# Patient Record
Sex: MALE | Race: WHITE | Employment: OTHER | URBAN - METROPOLITAN AREA
[De-identification: names, ages, dates, MRNs, and addresses within clinical notes are randomized per-mention and may not be internally consistent; named-entity substitution may affect disease eponyms.]

---

## 2017-05-06 ENCOUNTER — COMMUNICATION - HEALTHEAST (OUTPATIENT)
Dept: INTERNAL MEDICINE | Facility: CLINIC | Age: 82
End: 2017-05-06

## 2017-05-06 DIAGNOSIS — I10 HTN (HYPERTENSION): ICD-10-CM

## 2017-07-24 ENCOUNTER — RECORDS - HEALTHEAST (OUTPATIENT)
Dept: ADMINISTRATIVE | Facility: OTHER | Age: 82
End: 2017-07-24

## 2017-08-07 ENCOUNTER — RECORDS - HEALTHEAST (OUTPATIENT)
Dept: ADMINISTRATIVE | Facility: OTHER | Age: 82
End: 2017-08-07

## 2017-08-28 ENCOUNTER — AMBULATORY - HEALTHEAST (OUTPATIENT)
Dept: NURSING | Facility: CLINIC | Age: 82
End: 2017-08-28

## 2017-08-28 DIAGNOSIS — Z23 NEED FOR VACCINATION: ICD-10-CM

## 2017-09-05 ENCOUNTER — RECORDS - HEALTHEAST (OUTPATIENT)
Dept: ADMINISTRATIVE | Facility: OTHER | Age: 82
End: 2017-09-05

## 2018-02-20 ENCOUNTER — COMMUNICATION - HEALTHEAST (OUTPATIENT)
Dept: INTERNAL MEDICINE | Facility: CLINIC | Age: 83
End: 2018-02-20

## 2018-02-20 DIAGNOSIS — I10 HTN (HYPERTENSION): ICD-10-CM

## 2018-08-03 ENCOUNTER — RECORDS - HEALTHEAST (OUTPATIENT)
Dept: ADMINISTRATIVE | Facility: OTHER | Age: 83
End: 2018-08-03

## 2018-08-08 ENCOUNTER — RECORDS - HEALTHEAST (OUTPATIENT)
Dept: ADMINISTRATIVE | Facility: OTHER | Age: 83
End: 2018-08-08

## 2018-09-07 ENCOUNTER — RECORDS - HEALTHEAST (OUTPATIENT)
Dept: ADMINISTRATIVE | Facility: OTHER | Age: 83
End: 2018-09-07

## 2018-09-17 ENCOUNTER — COMMUNICATION - HEALTHEAST (OUTPATIENT)
Dept: INTERNAL MEDICINE | Facility: CLINIC | Age: 83
End: 2018-09-17

## 2018-09-21 ENCOUNTER — COMMUNICATION - HEALTHEAST (OUTPATIENT)
Dept: INTERNAL MEDICINE | Facility: CLINIC | Age: 83
End: 2018-09-21

## 2018-09-21 DIAGNOSIS — I10 HTN (HYPERTENSION): ICD-10-CM

## 2018-10-08 ENCOUNTER — AMBULATORY - HEALTHEAST (OUTPATIENT)
Dept: NURSING | Facility: CLINIC | Age: 83
End: 2018-10-08

## 2018-10-08 DIAGNOSIS — Z23 FLU VACCINE NEED: ICD-10-CM

## 2018-10-23 ENCOUNTER — RECORDS - HEALTHEAST (OUTPATIENT)
Dept: ADMINISTRATIVE | Facility: OTHER | Age: 83
End: 2018-10-23

## 2018-12-07 ENCOUNTER — COMMUNICATION - HEALTHEAST (OUTPATIENT)
Dept: INTERNAL MEDICINE | Facility: CLINIC | Age: 83
End: 2018-12-07

## 2018-12-14 ENCOUNTER — OFFICE VISIT - HEALTHEAST (OUTPATIENT)
Dept: INTERNAL MEDICINE | Facility: CLINIC | Age: 83
End: 2018-12-14

## 2018-12-14 DIAGNOSIS — Z01.818 PREOPERATIVE EXAMINATION: ICD-10-CM

## 2018-12-14 LAB — POTASSIUM BLD-SCNC: 4 MMOL/L (ref 3.5–5)

## 2018-12-14 ASSESSMENT — MIFFLIN-ST. JEOR: SCORE: 1379.79

## 2019-03-11 ENCOUNTER — RECORDS - HEALTHEAST (OUTPATIENT)
Dept: ADMINISTRATIVE | Facility: OTHER | Age: 84
End: 2019-03-11

## 2019-04-29 ENCOUNTER — COMMUNICATION - HEALTHEAST (OUTPATIENT)
Dept: INTERNAL MEDICINE | Facility: CLINIC | Age: 84
End: 2019-04-29

## 2019-04-29 DIAGNOSIS — I10 HTN (HYPERTENSION): ICD-10-CM

## 2019-04-30 ENCOUNTER — RECORDS - HEALTHEAST (OUTPATIENT)
Dept: ADMINISTRATIVE | Facility: OTHER | Age: 84
End: 2019-04-30

## 2019-06-11 ENCOUNTER — RECORDS - HEALTHEAST (OUTPATIENT)
Dept: ADMINISTRATIVE | Facility: OTHER | Age: 84
End: 2019-06-11

## 2019-07-09 ENCOUNTER — RECORDS - HEALTHEAST (OUTPATIENT)
Dept: ADMINISTRATIVE | Facility: OTHER | Age: 84
End: 2019-07-09

## 2019-07-16 ENCOUNTER — COMMUNICATION - HEALTHEAST (OUTPATIENT)
Dept: SCHEDULING | Facility: CLINIC | Age: 84
End: 2019-07-16

## 2019-07-16 ENCOUNTER — TRANSFERRED RECORDS (OUTPATIENT)
Dept: HEALTH INFORMATION MANAGEMENT | Facility: CLINIC | Age: 84
End: 2019-07-16

## 2019-08-05 ENCOUNTER — RECORDS - HEALTHEAST (OUTPATIENT)
Dept: ADMINISTRATIVE | Facility: OTHER | Age: 84
End: 2019-08-05

## 2019-08-06 ENCOUNTER — RECORDS - HEALTHEAST (OUTPATIENT)
Dept: ADMINISTRATIVE | Facility: OTHER | Age: 84
End: 2019-08-06

## 2019-09-24 ENCOUNTER — RECORDS - HEALTHEAST (OUTPATIENT)
Dept: ADMINISTRATIVE | Facility: OTHER | Age: 84
End: 2019-09-24

## 2019-10-15 ENCOUNTER — RECORDS - HEALTHEAST (OUTPATIENT)
Dept: ADMINISTRATIVE | Facility: OTHER | Age: 84
End: 2019-10-15

## 2020-06-17 ENCOUNTER — COMMUNICATION - HEALTHEAST (OUTPATIENT)
Dept: INTERNAL MEDICINE | Facility: CLINIC | Age: 85
End: 2020-06-17

## 2020-06-17 DIAGNOSIS — I10 HTN (HYPERTENSION): ICD-10-CM

## 2020-07-23 ENCOUNTER — COMMUNICATION - HEALTHEAST (OUTPATIENT)
Dept: SCHEDULING | Facility: CLINIC | Age: 85
End: 2020-07-23

## 2020-07-23 ENCOUNTER — OFFICE VISIT - HEALTHEAST (OUTPATIENT)
Dept: INTERNAL MEDICINE | Facility: CLINIC | Age: 85
End: 2020-07-23

## 2020-07-23 ENCOUNTER — TRANSFERRED RECORDS (OUTPATIENT)
Dept: HEALTH INFORMATION MANAGEMENT | Facility: CLINIC | Age: 85
End: 2020-07-23

## 2020-07-23 DIAGNOSIS — M79.10 MYALGIA: ICD-10-CM

## 2020-07-23 DIAGNOSIS — R50.9 FEVER, UNSPECIFIED FEVER CAUSE: ICD-10-CM

## 2020-07-23 ASSESSMENT — PATIENT HEALTH QUESTIONNAIRE - PHQ9: SUM OF ALL RESPONSES TO PHQ QUESTIONS 1-9: 0

## 2020-09-22 ENCOUNTER — TRANSFERRED RECORDS (OUTPATIENT)
Dept: HEALTH INFORMATION MANAGEMENT | Facility: CLINIC | Age: 85
End: 2020-09-22

## 2020-09-30 ENCOUNTER — TRANSCRIBE ORDERS (OUTPATIENT)
Dept: OTHER | Age: 85
End: 2020-09-30

## 2020-09-30 DIAGNOSIS — R91.8 PULMONARY NODULES: Primary | ICD-10-CM

## 2020-10-09 NOTE — TELEPHONE ENCOUNTER
ONCOLOGY INTAKE: Records Information      APPT INFORMATION:  Referring provider:  [facility],  Referring provider s clinic:  N/a  Reason for visit/diagnosis: Pulmonary Nodules  Has patient been notified of appointment date and time?: Yes    RECORDS INFORMATION:  Were the records received with the referral (via [employer])? Yes    Has patient been seen for any external appt for this diagnosis? Yes    If yes, where? [facility],    Has patient had any imaging or procedures outside of [place] for this condition? Yes      If Yes, where? [facility],    ADDITIONAL INFORMATION:  None

## 2020-10-12 NOTE — TELEPHONE ENCOUNTER
RECORDS STATUS - ALL OTHER DIAGNOSIS      RECORDS RECEIVED FROM: [facility]   Pulmonary nodules   DATE RECEIVED:    NOTES STATUS DETAILS   OFFICE NOTE from referring provider     OFFICE NOTE from medical oncologist Epic 10/9/2020 Pulmonary Nodule Conf note    DISCHARGE SUMMARY from hospital     DISCHARGE REPORT from the ER Care Everywhere  9/22/2020 ED note from Jarred Hubbard MD ([facility]  )   OPERATIVE REPORT     MEDICATION LIST Care Everywhere     CLINICAL TRIAL TREATMENTS TO DATE     LABS     PATHOLOGY REPORTS     ANYTHING RELATED TO DIAGNOSIS     GENONOMIC TESTING     TYPE:     IMAGING (NEED IMAGES & REPORT)     CT SCANS Care Everywhere - PACS [facility] Images  9/22/2020 CT Chest   9/22/2020 XR Chest   MRI     MAMMO     ULTRASOUND     PET

## 2020-10-14 ENCOUNTER — RECORDS - HEALTHEAST (OUTPATIENT)
Dept: ADMINISTRATIVE | Facility: OTHER | Age: 85
End: 2020-10-14

## 2020-10-14 ENCOUNTER — VIRTUAL VISIT (OUTPATIENT)
Dept: PULMONOLOGY | Facility: CLINIC | Age: 85
End: 2020-10-14
Attending: INTERNAL MEDICINE
Payer: MEDICARE

## 2020-10-14 ENCOUNTER — PRE VISIT (OUTPATIENT)
Dept: PULMONOLOGY | Facility: CLINIC | Age: 85
End: 2020-10-14

## 2020-10-14 DIAGNOSIS — R91.8 PULMONARY NODULES: Primary | ICD-10-CM

## 2020-10-14 PROCEDURE — 99204 OFFICE O/P NEW MOD 45 MIN: CPT | Mod: 95 | Performed by: INTERNAL MEDICINE

## 2020-10-14 RX ORDER — OXYCODONE HYDROCHLORIDE 5 MG/1
5-10 TABLET ORAL
COMMUNITY
Start: 2020-09-22 | End: 2020-11-13
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
## 2020-10-15 ENCOUNTER — RECORDS - HEALTHEAST (OUTPATIENT)
Dept: ADMINISTRATIVE | Facility: OTHER | Age: 85
End: 2020-10-15

## 2020-11-11 ENCOUNTER — PREP FOR PROCEDURE (OUTPATIENT)
Dept: SURGERY | Facility: CLINIC | Age: 85
End: 2020-11-11

## 2020-11-11 ENCOUNTER — TELEPHONE (OUTPATIENT)
Dept: PULMONOLOGY | Facility: CLINIC | Age: 85
End: 2020-11-11

## 2020-11-11 DIAGNOSIS — R59.0 MEDIASTINAL ADENOPATHY: Primary | ICD-10-CM

## 2020-11-11 RX ORDER — LIDOCAINE 40 MG/G
CREAM TOPICAL
Status: CANCELLED | OUTPATIENT
Start: 2020-11-11

## 2020-11-11 NOTE — TELEPHONE ENCOUNTER
Call patient to schedule procedure with Dr. Bigg Montes   There was no answer, detail message was left.     Soraya Contreras  [place]iana-Op Coordinator  [phone]

## 2020-11-12 DIAGNOSIS — Z11.59 ENCOUNTER FOR SCREENING FOR OTHER VIRAL DISEASES: Primary | ICD-10-CM

## 2020-11-12 PROBLEM — R91.8 PULMONARY NODULES: Status: ACTIVE | Noted: 2020-11-12

## 2020-11-12 NOTE — TELEPHONE ENCOUNTER
Spoke with patient to schedule procedure with Dr. Bigg Montes    Procedure was scheduled on 11/19 at [facility] OR  Patient will have H&P with PAC 11/13    Patient is aware a COVID-19 test is needed before their procedure. The test should be with-in 4 days of their procedure.   Test Details: Date 11/18  Location [facility]     Patient is aware a / is needed day of surgery.   Surgery letter was sent via [employer], patient has my direct contact information for any further questions.

## 2020-11-12 NOTE — TELEPHONE ENCOUNTER
FUTURE VISIT INFORMATION      SURGERY INFORMATION:    Date: 11/19/20    Location: [facility] or    Surgeon:  Richie Montes MD    Anesthesia Type:  general    Procedure: BRONCHOSCOPY, WITH BIOPSY OF 1 OR 2 LYMPH NODE STATIONS WITH ENDOBRONCHIAL ULTRASOUND GUIDANCE      RECORDS REQUESTED FROM:       [facility] Care [facility]    Pertinent Medical History: pulmonary nodules

## 2020-11-13 ENCOUNTER — RECORDS - HEALTHEAST (OUTPATIENT)
Dept: ADMINISTRATIVE | Facility: OTHER | Age: 85
End: 2020-11-13

## 2020-11-13 ENCOUNTER — VIRTUAL VISIT (OUTPATIENT)
Dept: SURGERY | Facility: CLINIC | Age: 85
End: 2020-11-13
Payer: MEDICARE

## 2020-11-13 ENCOUNTER — ANESTHESIA EVENT (OUTPATIENT)
Dept: SURGERY | Facility: CLINIC | Age: 85
End: 2020-11-13
Payer: MEDICARE

## 2020-11-13 ENCOUNTER — PRE VISIT (OUTPATIENT)
Dept: SURGERY | Facility: CLINIC | Age: 85
End: 2020-11-13

## 2020-11-13 VITALS — BODY MASS INDEX: 23.49 KG/M2 | WEIGHT: 155 LBS | HEIGHT: 68 IN

## 2020-11-13 DIAGNOSIS — Z01.818 PRE-OP EXAMINATION: Primary | ICD-10-CM

## 2020-11-13 PROCEDURE — 99203 OFFICE O/P NEW LOW 30 MIN: CPT | Mod: 95 | Performed by: PHYSICIAN ASSISTANT

## 2020-11-13 SDOH — HEALTH STABILITY: MENTAL HEALTH: HOW OFTEN DO YOU HAVE 6 OR MORE DRINKS ON ONE OCCASION?: NOT ASKED

## 2020-11-13 SDOH — HEALTH STABILITY: MENTAL HEALTH: HOW OFTEN DO YOU HAVE A DRINK CONTAINING ALCOHOL?: 4 OR MORE TIMES A WEEK

## 2020-11-13 SDOH — HEALTH STABILITY: MENTAL HEALTH: HOW MANY STANDARD DRINKS CONTAINING ALCOHOL DO YOU HAVE ON A TYPICAL DAY?: NOT ASKED

## 2020-11-13 ASSESSMENT — ENCOUNTER SYMPTOMS: SEIZURES: 0

## 2020-11-13 ASSESSMENT — PAIN SCALES - GENERAL: PAINLEVEL: NO PAIN (0)

## 2020-11-13 ASSESSMENT — LIFESTYLE VARIABLES: TOBACCO_USE: 1

## 2020-11-13 ASSESSMENT — MIFFLIN-ST. JEOR: SCORE: 1352.58

## 2020-11-13 NOTE — PROGRESS NOTES
"Lam Barrera Jr. is a 87 year old male who is being evaluated via a billable video visit.      The patient has been notified of following:     \"This video visit will be conducted via a call between you and your physician/provider. We have found that certain health care needs can be provided without the need for an in-person physical exam.  This service lets us provide the care you need with a video conversation.  If a prescription is necessary we can send it directly to your pharmacy.  If lab work is needed we can place an order for that and you can then stop by our lab to have the test done at a later time.    Video visits are billed at different rates depending on your insurance coverage.  Please reach out to your insurance provider with any questions.    If during the course of the call the physician/provider feels a video visit is not appropriate, you will not be charged for this service.\"    Patient has given verbal consent for Video visit? Yes  How would you like to obtain your AVS? Kamleshhart  If you are dropped from the video visit, the video invite should be resent to: Other e-mail: [email or web address]  Will anyone else be joining your video visit? No      Neil Etienne        "
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
## 2020-11-13 NOTE — PATIENT INSTRUCTIONS
Preparing for Your Surgery      Name:  Lam Barrera Jr.   MRN:  [id number]   :  1933   Today's Date:  2020       Arriving for surgery:  Surgery date:  2020  Arrival time:  8:45 am    Restrictions due to COVID 19:  Patients are allowed one visitor in the pre-op period  All visitors must wear a mask  No visitors under 18  No ill visitors   parking is available for anyone with mobility limitations or disabilities.  ([facility]  24 hours/ 7 days a week; [facility]  7 am- 3:30 pm, Mon- Fri)    Please come to:       [facility] [address]  [address] [place]  [address]       -    Please proceed to the Surgery Lounge on the 3rd floor. [phone]?     - ?If you are in need of directions, wheelchair or escort please stop at the Information Desk in the lobby.      What can I eat or drink?  -  You may eat and drink normally for up to 8 hours before your surgery. (Until Midnight)  -  You may have clear liquids until 2 hours before surgery. (Until 8:45 am arrival time )  Examples of clear liquids:  Water  Clear broth  Juices (apple, white grape, white cranberry  and cider) without pulp  Noncarbonated, powder based beverages  (lemonade and Aron-Aid)  Sodas (Sprite, 7-Up, ginger ale and seltzer)  Coffee or tea (without milk or cream)  Gatorade    -  No Alcohol for at least 24 hours before surgery     Which medicines can I take?    Hold Aspirin for 7 days before surgery.   Hold Multivitamins for 7 days before surgery.  Hold Supplements for 7 days before surgery.  Hold Ibuprofen (Advil, Motrin) for 1 day before surgery--unless otherwise directed by surgeon.  Hold Naproxen (Aleve) for 4 days before surgery.    -  DO NOT take these medications the day of surgery:  Hydrochlorothiazide      -  PLEASE TAKE these medications the day of surgery:  Terazosin      How do I prepare myself?  - Please take 2 showers before surgery using Scrubcare or Hibiclens soap.    Use this  soap only from the neck to your toes.     Leave the soap on your skin for one minute--then rinse thoroughly.      You may use your own shampoo and conditioner; no other hair products.   - Please remove all jewelry and body piercings.  - No lotions, deodorants or fragrance.  - No makeup or fingernail polish.   - Bring your ID and insurance card.    - All patients are required to have a Covid-19 test within 4 days of surgery/procedure.      -Patients will be contacted by the [facility] scheduling team within 1 week of surgery to make an appointment.      - Patients may call the Scheduling team at [phone] if they have not been scheduled within 4 days of  surgery.      ALL PATIENTS GOING HOME THE SAME DAY OF SURGERY ARE REQUIRED TO HAVE A RESPONSIBLE ADULT TO DRIVE AND BE IN ATTENDANCE WITH THEM FOR 24 HOURS FOLLOWING SURGERY     Questions or Concerns:    - For any questions regarding the day of surgery or your hospital stay, please contact the Pre Admission Nursing Office at [phone].       - If you have health changes between today and your surgery please call your surgeon.       For questions after surgery please call your surgeons office.

## 2020-11-17 DIAGNOSIS — Z11.59 ENCOUNTER FOR SCREENING FOR OTHER VIRAL DISEASES: ICD-10-CM

## 2020-11-17 PROCEDURE — U0003 INFECTIOUS AGENT DETECTION BY NUCLEIC ACID (DNA OR RNA); SEVERE ACUTE RESPIRATORY SYNDROME CORONAVIRUS 2 (SARS-COV-2) (CORONAVIRUS DISEASE [COVID-19]), AMPLIFIED PROBE TECHNIQUE, MAKING USE OF HIGH THROUGHPUT TECHNOLOGIES AS DESCRIBED BY CMS-2020-01-R: HCPCS | Performed by: PATHOLOGY

## 2020-11-18 LAB
SARS-COV-2 RNA SPEC QL NAA+PROBE: NOT DETECTED
SPECIMEN SOURCE: NORMAL

## 2020-11-19 ENCOUNTER — APPOINTMENT (OUTPATIENT)
Dept: GENERAL RADIOLOGY | Facility: CLINIC | Age: 85
End: 2020-11-19
Attending: STUDENT IN AN ORGANIZED HEALTH CARE EDUCATION/TRAINING PROGRAM
Payer: MEDICARE

## 2020-11-19 ENCOUNTER — ANESTHESIA (OUTPATIENT)
Dept: SURGERY | Facility: CLINIC | Age: 85
End: 2020-11-19
Payer: MEDICARE

## 2020-11-19 ENCOUNTER — HOSPITAL ENCOUNTER (OUTPATIENT)
Facility: CLINIC | Age: 85
Discharge: HOME | End: 2020-11-19
Attending: INTERNAL MEDICINE | Admitting: INTERNAL MEDICINE
Payer: MEDICARE

## 2020-11-19 ENCOUNTER — TELEPHONE (OUTPATIENT)
Dept: PULMONOLOGY | Facility: CLINIC | Age: 85
End: 2020-11-19

## 2020-11-19 VITALS
OXYGEN SATURATION: 93 % | DIASTOLIC BLOOD PRESSURE: 63 MMHG | WEIGHT: 160.5 LBS | HEART RATE: 67 BPM | SYSTOLIC BLOOD PRESSURE: 126 MMHG | TEMPERATURE: 98.2 F | BODY MASS INDEX: 24.32 KG/M2 | HEIGHT: 68 IN | RESPIRATION RATE: 16 BRPM

## 2020-11-19 DIAGNOSIS — R91.8 PULMONARY NODULES: ICD-10-CM

## 2020-11-19 LAB
CREAT SERPL-MCNC: 1.2 MG/DL (ref 0.66–1.25)
GFR SERPL CREATININE-BSD FRML MDRD: 54 ML/MIN/{1.73_M2}
GLUCOSE BLDC GLUCOMTR-MCNC: 96 MG/DL (ref 70–99)
HGB BLD-MCNC: 15.3 G/DL (ref 13.3–17.7)
POTASSIUM SERPL-SCNC: 3.8 MMOL/L (ref 3.4–5.3)

## 2020-11-19 PROCEDURE — 88305 TISSUE EXAM BY PATHOLOGIST: CPT | Mod: TC | Performed by: INTERNAL MEDICINE

## 2020-11-19 PROCEDURE — 272N000001 HC OR GENERAL SUPPLY STERILE: Performed by: INTERNAL MEDICINE

## 2020-11-19 PROCEDURE — 250N000009 HC RX 250: Performed by: NURSE ANESTHETIST, CERTIFIED REGISTERED

## 2020-11-19 PROCEDURE — 84132 ASSAY OF SERUM POTASSIUM: CPT | Performed by: ANESTHESIOLOGY

## 2020-11-19 PROCEDURE — 250N000011 HC RX IP 250 OP 636: Performed by: NURSE ANESTHETIST, CERTIFIED REGISTERED

## 2020-11-19 PROCEDURE — 88172 CYTP DX EVAL FNA 1ST EA SITE: CPT | Mod: 26 | Performed by: PATHOLOGY

## 2020-11-19 PROCEDURE — 360N000029 HC SURGERY LEVEL 4 EA 15 ADDTL MIN - UMMC: Performed by: INTERNAL MEDICINE

## 2020-11-19 PROCEDURE — 71045 X-RAY EXAM CHEST 1 VIEW: CPT | Mod: 26 | Performed by: RADIOLOGY

## 2020-11-19 PROCEDURE — 999N000065 XR CHEST PORT 1 VW

## 2020-11-19 PROCEDURE — 999N000139 HC STATISTIC PRE-PROCEDURE ASSESSMENT II: Performed by: INTERNAL MEDICINE

## 2020-11-19 PROCEDURE — 360N000031 HC SURGERY LEVEL 4 W FLUORO 1ST 30 MIN - UMMC: Performed by: INTERNAL MEDICINE

## 2020-11-19 PROCEDURE — 761N000003 HC RECOVERY PHASE 1 LEVEL 2 FIRST HR: Performed by: INTERNAL MEDICINE

## 2020-11-19 PROCEDURE — 999N000054 HC STATISTIC EKG NON-CHARGEABLE

## 2020-11-19 PROCEDURE — 370N000002 HC ANESTHESIA TECHNICAL FEE, EACH ADDTL 15 MIN: Performed by: INTERNAL MEDICINE

## 2020-11-19 PROCEDURE — 93005 ELECTROCARDIOGRAM TRACING: CPT

## 2020-11-19 PROCEDURE — 999N001018 HC STATISTIC H-CELL BLOCK W/STAIN: Performed by: INTERNAL MEDICINE

## 2020-11-19 PROCEDURE — 88305 TISSUE EXAM BY PATHOLOGIST: CPT | Mod: 26 | Performed by: PATHOLOGY

## 2020-11-19 PROCEDURE — 85018 HEMOGLOBIN: CPT | Performed by: ANESTHESIOLOGY

## 2020-11-19 PROCEDURE — 761N000007 HC RECOVERY PHASE 2 EACH 15 MINS: Performed by: INTERNAL MEDICINE

## 2020-11-19 PROCEDURE — 36415 COLL VENOUS BLD VENIPUNCTURE: CPT | Performed by: ANESTHESIOLOGY

## 2020-11-19 PROCEDURE — 370N000001 HC ANESTHESIA TECHNICAL FEE, 1ST 30 MIN: Performed by: INTERNAL MEDICINE

## 2020-11-19 PROCEDURE — 88173 CYTOPATH EVAL FNA REPORT: CPT | Mod: TC | Performed by: INTERNAL MEDICINE

## 2020-11-19 PROCEDURE — 31652 BRONCH EBUS SAMPLNG 1/2 NODE: CPT | Mod: GC | Performed by: INTERNAL MEDICINE

## 2020-11-19 PROCEDURE — 82565 ASSAY OF CREATININE: CPT | Performed by: ANESTHESIOLOGY

## 2020-11-19 PROCEDURE — 88173 CYTOPATH EVAL FNA REPORT: CPT | Mod: 26 | Performed by: PATHOLOGY

## 2020-11-19 PROCEDURE — 258N000003 HC RX IP 258 OP 636: Performed by: NURSE ANESTHETIST, CERTIFIED REGISTERED

## 2020-11-19 PROCEDURE — 93010 ELECTROCARDIOGRAM REPORT: CPT | Mod: 59 | Performed by: INTERNAL MEDICINE

## 2020-11-19 PROCEDURE — 88172 CYTP DX EVAL FNA 1ST EA SITE: CPT | Mod: TC | Performed by: INTERNAL MEDICINE

## 2020-11-19 PROCEDURE — 250N000009 HC RX 250: Performed by: STUDENT IN AN ORGANIZED HEALTH CARE EDUCATION/TRAINING PROGRAM

## 2020-11-19 PROCEDURE — 999N001017 HC STATISTIC GLUCOSE BY METER IP

## 2020-11-19 PROCEDURE — 258N000003 HC RX IP 258 OP 636: Performed by: ANESTHESIOLOGY

## 2020-11-19 RX ORDER — PROPOFOL 10 MG/ML
INJECTION, EMULSION INTRAVENOUS CONTINUOUS PRN
Status: DISCONTINUED | OUTPATIENT
Start: 2020-11-19 | End: 2020-11-19

## 2020-11-19 RX ORDER — EPHEDRINE SULFATE 50 MG/ML
INJECTION, SOLUTION INTRAMUSCULAR; INTRAVENOUS; SUBCUTANEOUS PRN
Status: DISCONTINUED | OUTPATIENT
Start: 2020-11-19 | End: 2020-11-19

## 2020-11-19 RX ORDER — PROPOFOL 10 MG/ML
INJECTION, EMULSION INTRAVENOUS PRN
Status: DISCONTINUED | OUTPATIENT
Start: 2020-11-19 | End: 2020-11-19

## 2020-11-19 RX ORDER — LIDOCAINE 40 MG/G
CREAM TOPICAL
Status: DISCONTINUED | OUTPATIENT
Start: 2020-11-19 | End: 2020-11-19 | Stop reason: HOSPADM

## 2020-11-19 RX ORDER — DEXAMETHASONE SODIUM PHOSPHATE 4 MG/ML
INJECTION, SOLUTION INTRA-ARTICULAR; INTRALESIONAL; INTRAMUSCULAR; INTRAVENOUS; SOFT TISSUE PRN
Status: DISCONTINUED | OUTPATIENT
Start: 2020-11-19 | End: 2020-11-19

## 2020-11-19 RX ORDER — GLYCOPYRROLATE 0.2 MG/ML
INJECTION, SOLUTION INTRAMUSCULAR; INTRAVENOUS PRN
Status: DISCONTINUED | OUTPATIENT
Start: 2020-11-19 | End: 2020-11-19

## 2020-11-19 RX ORDER — ONDANSETRON 2 MG/ML
INJECTION INTRAMUSCULAR; INTRAVENOUS PRN
Status: DISCONTINUED | OUTPATIENT
Start: 2020-11-19 | End: 2020-11-19

## 2020-11-19 RX ORDER — SODIUM CHLORIDE, SODIUM LACTATE, POTASSIUM CHLORIDE, CALCIUM CHLORIDE 600; 310; 30; 20 MG/100ML; MG/100ML; MG/100ML; MG/100ML
INJECTION, SOLUTION INTRAVENOUS CONTINUOUS
Status: DISCONTINUED | OUTPATIENT
Start: 2020-11-19 | End: 2020-11-19 | Stop reason: HOSPADM

## 2020-11-19 RX ORDER — FENTANYL CITRATE 50 UG/ML
INJECTION, SOLUTION INTRAMUSCULAR; INTRAVENOUS PRN
Status: DISCONTINUED | OUTPATIENT
Start: 2020-11-19 | End: 2020-11-19

## 2020-11-19 RX ORDER — LIDOCAINE HYDROCHLORIDE 20 MG/ML
INJECTION, SOLUTION INFILTRATION; PERINEURAL PRN
Status: DISCONTINUED | OUTPATIENT
Start: 2020-11-19 | End: 2020-11-19

## 2020-11-19 RX ADMIN — PROPOFOL 50 MG: 10 INJECTION, EMULSION INTRAVENOUS at 10:47

## 2020-11-19 RX ADMIN — GLYCOPYRROLATE 0.2 MG: 0.2 INJECTION, SOLUTION INTRAMUSCULAR; INTRAVENOUS at 10:44

## 2020-11-19 RX ADMIN — PROPOFOL 30 MG: 10 INJECTION, EMULSION INTRAVENOUS at 10:33

## 2020-11-19 RX ADMIN — PROPOFOL 20 MG: 10 INJECTION, EMULSION INTRAVENOUS at 10:28

## 2020-11-19 RX ADMIN — ONDANSETRON 4 MG: 2 INJECTION INTRAMUSCULAR; INTRAVENOUS at 10:31

## 2020-11-19 RX ADMIN — PROPOFOL 200 MG: 10 INJECTION, EMULSION INTRAVENOUS at 10:20

## 2020-11-19 RX ADMIN — PROPOFOL 30 MG: 10 INJECTION, EMULSION INTRAVENOUS at 10:54

## 2020-11-19 RX ADMIN — SODIUM CHLORIDE, POTASSIUM CHLORIDE, SODIUM LACTATE AND CALCIUM CHLORIDE: 600; 310; 30; 20 INJECTION, SOLUTION INTRAVENOUS at 10:12

## 2020-11-19 RX ADMIN — DEXAMETHASONE SODIUM PHOSPHATE 8 MG: 4 INJECTION, SOLUTION INTRA-ARTICULAR; INTRALESIONAL; INTRAMUSCULAR; INTRAVENOUS; SOFT TISSUE at 10:27

## 2020-11-19 RX ADMIN — FENTANYL CITRATE 50 MCG: 50 INJECTION, SOLUTION INTRAMUSCULAR; INTRAVENOUS at 10:28

## 2020-11-19 RX ADMIN — Medication 10 MG: at 10:24

## 2020-11-19 RX ADMIN — PHENYLEPHRINE HYDROCHLORIDE 200 MCG: 10 INJECTION INTRAVENOUS at 10:39

## 2020-11-19 RX ADMIN — FENTANYL CITRATE 50 MCG: 50 INJECTION, SOLUTION INTRAMUSCULAR; INTRAVENOUS at 10:20

## 2020-11-19 RX ADMIN — PHENYLEPHRINE HYDROCHLORIDE 200 MCG: 10 INJECTION INTRAVENOUS at 10:29

## 2020-11-19 RX ADMIN — PROPOFOL 40 MG: 10 INJECTION, EMULSION INTRAVENOUS at 10:52

## 2020-11-19 RX ADMIN — LIDOCAINE HYDROCHLORIDE 100 MG: 20 INJECTION, SOLUTION INFILTRATION; PERINEURAL at 10:20

## 2020-11-19 RX ADMIN — PROPOFOL 150 MCG/KG/MIN: 10 INJECTION, EMULSION INTRAVENOUS at 10:20

## 2020-11-19 ASSESSMENT — MIFFLIN-ST. JEOR: SCORE: 1377.5

## 2020-11-19 NOTE — ANESTHESIA PROCEDURE NOTES
Airway    Patient location during procedure: OR    Staff -   Anesthesiologist:  Leticia Nicholas MD  CRNA: Juan Patricio APRN CRNA  Performed By: CRNA    Consent for Airway   Urgency: elective    Indications and Patient Condition  Indications for airway management: andra-procedural  Induction type:intravenousMask difficulty assessment: 1 - vent by mask    Final Airway Details  Final airway type: supraglottic airway    Endotracheal Airway Details   Secured with: pink tape    Post intubation assessment   Placement verified by: capnometry, equal breath sounds and chest rise   Number of attempts at approach: 1  Secured with:pink tape  Ease of procedure: easy  Dentition: Intact and Unchanged

## 2020-11-19 NOTE — ANESTHESIA POSTPROCEDURE EVALUATION
Anesthesia POST Procedure Evaluation    Patient: Lam Barrera Jr.   MRN:     [id number] Gender:   male   Age:    87 year old :      1933        Preoperative Diagnosis: Pulmonary nodules [R91.8]   Procedure(s):  Flexible BRONCHOSCOPY,  Nodule biospy, Endobronchial Ultrasound guidance  WITH BIOPSY OF 1 OR 2 LYMPH NODE STATIONS WITH ENDOBRONCHIAL ULTRASOUND GUIDANCE   Postop Comments: No value filed.     Anesthesia Type: No value filed.       Disposition: Outpatient   Postop Pain Control: Uneventful            Sign Out: Well controlled pain   PONV: No   Neuro/Psych: Uneventful            Sign Out: Acceptable/Baseline neuro status   Airway/Respiratory: Uneventful            Sign Out: Acceptable/Baseline resp. status   CV/Hemodynamics: Uneventful            Sign Out: Acceptable CV status   Other NRE: NONE   DID A NON-ROUTINE EVENT OCCUR? No         Last Anesthesia Record Vitals:  CRNA VITALS  2020 1043 - 2020 1143      2020             NIBP:  124/77    Pulse:  96    NIBP Mean:  91    Temp:  36.8  C (98.3  F)    SpO2:  99 %    Resp Rate (observed):  16    EKG:  Sinus rhythm          Last PACU Vitals:  Vitals Value Taken Time   /60 20 1220   Temp 36.7  C (98  F) 20 1200   Pulse 73 20 1226   Resp 14 20 1215   SpO2 94 % 20 1226   Temp src     NIBP 124/77 20 1116   Pulse 96 20 1116   SpO2 99 % 20 1116   Resp     Temp 36.8  C (98.3  F) 20 1116   Ht Rate     Temp 2     Vitals shown include unvalidated device data.      Electronically Signed By: Leticia Nicholas MD, 2020, 12:27 PM

## 2020-11-19 NOTE — OR NURSING
CXR completed at bedside in pacu    Dr. JOSSELYN Cid text paged for read- called this writer back stated XR looks good and pt ok to discharge at this time.

## 2020-11-19 NOTE — DISCHARGE INSTRUCTIONS
Post-Bronchoscopy Patient Instructions:    November 19, 2020  Lam Barrera Jr.      Your procedure (lung nodule biopsy) was completed without any immediate complications.      You may cough up scant amount of blood for the next 12-24 hours. If you have excessive cough with blood, chest pain, shortness of breath or other concerning symptoms, please report to the closest emergency room.      You may experience low grade (less than 100.5 F) fever next 24 hours for which you can take Tylenol. If the fever persists more than 24 hours contact our office or your primary care provider.      Our office (Pulmonary--[phone]) will call you when the results from today's procedure become available      You  resume your regular diet as it was prior to procedure.      Should you have any question, please do not hesitate to call our office.    [facility], [place]  Same-Day Surgery   Adult Discharge Orders & Instructions     For 24 hours after surgery    1. Get plenty of rest.  A responsible adult must stay with you for at least 24 hours after you leave the hospital.   2. Do not drive or use heavy equipment.  If you have weakness or tingling, don't drive or use heavy equipment until this feeling goes away.  3. Do not drink alcohol.  4. Avoid strenuous or risky activities.  Ask for help when climbing stairs.   5. You may feel lightheaded.  IF so, sit for a few minutes before standing.  Have someone help you get up.   6. If you have nausea (feel sick to your stomach): Drink only clear liquids such as apple juice, ginger ale, broth or 7-Up.  Rest may also help.  Be sure to drink enough fluids.  Move to a regular diet as you feel able.  7. You may have a slight fever. Call the doctor if your fever is over 100 F (37.7 C) (taken under the tongue) or lasts longer than 24 hours.  8. You may have a dry mouth, a sore throat, muscle aches or trouble sleeping.  These should go away after 24 hours.  9. Do not  make important or legal decisions.   Call your doctor for any of the followin.  Signs of infection (fever, growing tenderness at the surgery site, a large amount of drainage or bleeding, severe pain, foul-smelling drainage, redness, swelling).    2. It has been over 8 to 10 hours since surgery and you are still not able to urinate (pass water).    3.  Headache for over 24 hours.    To contact a doctor, call Dr. Montes's office at [phone] during regular business hours 8:00 am to 4:30 pm or:    '   After hours and weekends call  [phone] and ask for the resident on call for   PULMONARY _ (answered 24 hours a day)  '   Emergency Department:    [facility]: [phone]       (TTY for hearing impaired: [phone])

## 2020-11-19 NOTE — ANESTHESIA CARE TRANSFER NOTE
Patient: Lam Barrera Jr.    Procedure(s):  Flexible BRONCHOSCOPY,  Nodule biospy, Endobronchial Ultrasound guidance  WITH BIOPSY OF 1 OR 2 LYMPH NODE STATIONS WITH ENDOBRONCHIAL ULTRASOUND GUIDANCE    Diagnosis: Pulmonary nodules [R91.8]  Diagnosis Additional Information: No value filed.    Anesthesia Type:   No value filed.     Note:  Airway :Face Mask  Patient transferred to:PACU  Comments: Patient transferred to PACU in stable condition, breathing spontaneously on face mask, VSS.  Report given to RN.Handoff Report: Identifed the Patient, Identified the Reponsible Provider, Reviewed the pertinent medical history, Discussed the surgical course, Reviewed Intra-OP anesthesia mangement and issues during anesthesia, Set expectations for post-procedure period and Allowed opportunity for questions and acknowledgement of understanding      Vitals: (Last set prior to Anesthesia Care Transfer)    CRNA VITALS  11/19/2020 1043 - 11/19/2020 1116      11/19/2020             Pulse:  95    Ht Rate:  96    SpO2:  100 %    Resp Rate (observed):  (!) 2                Electronically Signed By: ROSA ISELA Walls CRNA  November 19, 2020  11:16 AM

## 2020-11-19 NOTE — PROCEDURES
INTERVENTIONAL PULMONOLOGY       Procedure(s):    A flexible bronchoscopy  Airway exam  EBUS-TBNA (1 sites)  Therapeutic suctioning (1 sites)    Indication:  RUL nodule, need for diagnosis    Attending of Record:  Bigg Montes MD    Interventional Pulmonary Fellow   Courtney Cid MD     Trainees Present:   None     Medications:    General Anesthesia - See anesthesia flowsheet for details    Sedation Time:   Per Anesthesia Care Provider    Time Out:  Performed    The patient's medical record has been reviewed.  The indication for the procedure was reviewed.  The necessary history and physical examination was performed and reviewed.  The risks, benefits and alternatives of the procedure were discussed with the the patient in detail and he had the opportunity to ask questions. All questions were answered to the best of my ability.  Verbal and written informed consent was obtained.  The proposed procedure and the patient's identification were verified prior to the procedure by the physician and the surgical team.    After clinical evaluation and reviewing the indication, risks, alternatives and benefits of the procedure the patient was deemed to be in satisfactory condition to undergo the procedure.      A Tuberculosis risk assessment was performed:  The patient has no known RISK of Tuberculosis    The procedure was performed in a negative airflow room: The patient could not be moved to a negative airflow room because of needed OR for the procedure    Maneuvers / Procedure:      Airway Examination: A complete airway examination was performed from the distal trachea to the subsegmental level in each lobe of both lungs.  Pertinent findings include: No endobronchial lesions.         EBUS-TBNA: The EBUS scope was inserted and biopsies were obtained from  RUL nodule with 6 passes, 6 samples obtained with ELSIE present, a combination of suction and no suction was used to obtain the samples. EBUS samples were sent for  cytology.                            Any disposable equipment was visually inspected and deemed to be intact immediately post procedure.      Relevant Pictures  RUL nodule      Recommendations:     --Followup biopsy results.    Courtney Cid  Interventional Pulmonary Fellow  [phone]

## 2020-11-19 NOTE — OR NURSING
Instructions reviewed with daughter Dayanara over the phone. Responsible adult verbalized understanding of discharge instructions. Paper copy of discharge instructions sent with patient.

## 2020-11-20 LAB — COPATH REPORT: NORMAL

## 2020-11-20 NOTE — TELEPHONE ENCOUNTER
Received phone call from patient's daughter (Dayanara) about Mr. Barrera. He reportedly has not urinated since his EBUS biopsy earlier today. Per the family, he is acting his usual self and denies any symptoms (including pain). He doubled up his usual BPH medication dose.    I discussed with the daughter that it would be reasonable to observe him overnight and to monitor closely for concerning symptoms such as abdominal/suprapubic pain, confusion, fevers. Should he develop any of these symptoms overnight, he should be evaluated urgently for urinary retention. In addition, I recommended that a family member check in on him 1-2 times overnight. If by the morning, he still has not urinated, he should contact his PCP for further guidance as he may require bladder ultrasound +/- straight catheterization and labs for urinary retention. The daughter was understanding of the above instructions and agreed with the plan. All questions and concerns addressed.

## 2020-11-21 LAB — INTERPRETATION ECG - MUSE: NORMAL

## 2020-11-27 DIAGNOSIS — R59.0 MEDIASTINAL ADENOPATHY: Primary | ICD-10-CM

## 2020-11-27 NOTE — PROGRESS NOTES
I called patient to let him know that results from his recent bronchoscopy were negative for malignancy.   I did tell him that one specimen demonstrated atypical cells and we would like to repeat his chest CT in 6 months and have him see Dr. Swan again.    He agrees with this plan and appreciated my call.   Message was sent to scheduling.

## 2020-12-27 ENCOUNTER — HEALTH MAINTENANCE LETTER (OUTPATIENT)
Age: 85
End: 2020-12-27

## 2021-02-04 ENCOUNTER — AMBULATORY - HEALTHEAST (OUTPATIENT)
Dept: NURSING | Facility: CLINIC | Age: 86
End: 2021-02-04

## 2021-02-25 ENCOUNTER — AMBULATORY - HEALTHEAST (OUTPATIENT)
Dept: NURSING | Facility: CLINIC | Age: 86
End: 2021-02-25

## 2021-03-09 ENCOUNTER — COMMUNICATION - HEALTHEAST (OUTPATIENT)
Dept: INTERNAL MEDICINE | Facility: CLINIC | Age: 86
End: 2021-03-09

## 2021-04-05 ENCOUNTER — RECORDS - HEALTHEAST (OUTPATIENT)
Dept: ADMINISTRATIVE | Facility: OTHER | Age: 86
End: 2021-04-05

## 2021-04-30 ENCOUNTER — RECORDS - HEALTHEAST (OUTPATIENT)
Dept: INTERNAL MEDICINE | Facility: CLINIC | Age: 86
End: 2021-04-30

## 2021-05-10 ENCOUNTER — DOCUMENTATION ONLY (OUTPATIENT)
Dept: SURGERY | Facility: CLINIC | Age: 86
End: 2021-05-10

## 2021-05-10 DIAGNOSIS — D03.59 MELANOMA IN SITU OF TORSO EXCLUDING BREAST (H): ICD-10-CM

## 2021-05-10 NOTE — PROGRESS NOTES
I spoke to a nurse at [facility] through [facility] [phone].    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   It was felt to be a lipoma but once removed, it was melanoma.  They are getting a PET scan tomorrow and she asked if we would cancel the planned chest CT scan next week.       The PET is being done a [facility]-  She will ask them to push images to us, as well.

## 2021-05-27 ASSESSMENT — PATIENT HEALTH QUESTIONNAIRE - PHQ9: SUM OF ALL RESPONSES TO PHQ QUESTIONS 1-9: 0

## 2021-05-28 NOTE — TELEPHONE ENCOUNTER
RN cannot approve Refill Request    RN can NOT refill this medication PCP messaged that patient is overdue for Labs.       Reva Slater, [facility] Triage/Med Refill 4/30/2019    Requested Prescriptions   Pending Prescriptions Disp Refills     hydroCHLOROthiazide (HYDRODIURIL) 25 MG tablet [Pharmacy Med Name: HYDROCHLOROTHIAZIDE 25 MG Tablet] 90 tablet 3     Sig: TAKE 1 TABLET EVERY DAY       Diuretics/Combination Diuretics Refill Protocol  Failed - 4/29/2019  2:32 PM        Failed - Serum Sodium in past 12 months      No results found for: LN-SODIUM          Failed - Serum Creatinine in past 12 months      Creatinine   Date Value Ref Range Status   05/31/2011 1.5 (H) 0.6 - 1.3 mg/dL Final             Passed - Visit with PCP or prescribing provider visit in past 12 months     Last office visit with prescriber/PCP: Visit date not found OR same dept: Visit date not found OR same specialty: Visit date not found  Last physical: 12/14/2018 Last MTM visit: Visit date not found   Next visit within 3 mo: Visit date not found  Next physical within 3 mo: Visit date not found  Prescriber OR PCP: Josh Bahena MD  Last diagnosis associated with med order: 1. HTN (hypertension)  - hydroCHLOROthiazide (HYDRODIURIL) 25 MG tablet [Pharmacy Med Name: HYDROCHLOROTHIAZIDE 25 MG Tablet]; TAKE 1 TABLET EVERY DAY  Dispense: 90 tablet; Refill: 2    If protocol passes may refill for 12 months if within 3 months of last provider visit (or a total of 15 months).             Passed - Serum Potassium in past 12 months      Lab Results   Component Value Date    Potassium 4.0 12/14/2018             Passed - Blood pressure on file in past 12 months     BP Readings from Last 1 Encounters:   12/14/18 102/62

## 2021-05-29 ENCOUNTER — RECORDS - HEALTHEAST (OUTPATIENT)
Dept: ADMINISTRATIVE | Facility: CLINIC | Age: 86
End: 2021-05-29
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
## 2021-05-30 NOTE — TELEPHONE ENCOUNTER
Spoke with the patient and relayed message below from Dr. Durham.  Patient verbalized understanding and had no further questions at this time.  Sonja SADLER, ALCIRA/CMT....................9:20 AM

## 2021-06-02 VITALS — WEIGHT: 161 LBS | BODY MASS INDEX: 24.4 KG/M2 | HEIGHT: 68 IN

## 2021-06-03 ENCOUNTER — COMMUNICATION - HEALTHEAST (OUTPATIENT)
Dept: SCHEDULING | Facility: CLINIC | Age: 86
End: 2021-06-03

## 2021-06-03 ENCOUNTER — HOSPITAL ENCOUNTER (EMERGENCY)
Facility: CLINIC | Age: 86
Discharge: HOME | End: 2021-06-03
Attending: FAMILY MEDICINE | Admitting: FAMILY MEDICINE
Payer: MEDICARE

## 2021-06-03 VITALS
HEIGHT: 68 IN | RESPIRATION RATE: 20 BRPM | HEART RATE: 85 BPM | BODY MASS INDEX: 23.79 KG/M2 | TEMPERATURE: 99 F | DIASTOLIC BLOOD PRESSURE: 71 MMHG | OXYGEN SATURATION: 95 % | SYSTOLIC BLOOD PRESSURE: 145 MMHG | WEIGHT: 157 LBS

## 2021-06-03 DIAGNOSIS — J01.90 ACUTE SINUSITIS, RECURRENCE NOT SPECIFIED, UNSPECIFIED LOCATION: ICD-10-CM

## 2021-06-03 PROCEDURE — 99283 EMERGENCY DEPT VISIT LOW MDM: CPT | Performed by: FAMILY MEDICINE

## 2021-06-03 PROCEDURE — 99284 EMERGENCY DEPT VISIT MOD MDM: CPT | Performed by: FAMILY MEDICINE

## 2021-06-03 PROCEDURE — 250N000013 HC RX MED GY IP 250 OP 250 PS 637: Performed by: FAMILY MEDICINE

## 2021-06-03 RX ORDER — FLUTICASONE PROPIONATE 50 MCG
1 SPRAY, SUSPENSION (ML) NASAL DAILY
Qty: 18.2 ML | Refills: 0 | Status: SHIPPED | OUTPATIENT
Start: 2021-06-03 | End: 2021-06-23

## 2021-06-03 RX ORDER — AMOXICILLIN 500 MG/1
500 CAPSULE ORAL ONCE
Status: COMPLETED | OUTPATIENT
Start: 2021-06-03 | End: 2021-06-03

## 2021-06-03 RX ORDER — AMOXICILLIN 875 MG
875 TABLET ORAL 2 TIMES DAILY
Qty: 20 TABLET | Refills: 0 | Status: SHIPPED | OUTPATIENT
Start: 2021-06-03 | End: 2021-06-23

## 2021-06-03 RX ADMIN — AMOXICILLIN 500 MG: 500 CAPSULE ORAL at 23:21

## 2021-06-03 ASSESSMENT — MIFFLIN-ST. JEOR: SCORE: 1361.65

## 2021-06-04 NOTE — DISCHARGE INSTRUCTIONS
You can try taking amoxicillin 875 mg twice daily for 7 days.  Use fluticasone nasal spray 1 spray in each nostril once or twice daily for 7 days.

## 2021-06-04 NOTE — ED PROVIDER NOTES
History     Chief Complaint   Patient presents with     Sinusitis     10 days/ not getting better/ Stage 4 Cancer melanoma     HPI  Lam Barrera Jr. is a 87 year old male who who presents with his daughter with 10 days of nasal congestion and rhinorrhea.  His right ear feels plugged.  It is hard for him to sleep at night due to the congestion.  He has had a little bit of a cough and feels like it is hard to clear his throat.  He has not had a fever and is not short of breath.  He is being treated for widely metastatic melanoma.  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  He is being treated with biological therapy and brain radiation.  He wants to have an antibiotic to try to get rid of the sinus infection.    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     Priority: Medium     Pulmonary nodules 11/12/2020     Priority: Medium     Added automatically from request for surgery [id number]       Mediastinal adenopathy 11/11/2020     Priority: Medium     Added automatically from request for surgery [id number]          Past Medical History:    Past Medical History:   Diagnosis Date     BPH (benign prostatic hyperplasia)      Hypertension      Macular degeneration      Nonsenile cataract      Pulmonary nodules        Past Surgical History:    Past Surgical History:   Procedure Laterality Date     BRONCHOSCOPY RIDID OR FLEXIBLE W/ENDOBRONCHIAL ULTRASOUND GUIDED 1 OR 2 NODE STATIONS N/A 11/19/2020    Procedure: Flexible BRONCHOSCOPY,  Nodule biospy, Endobronchial Ultrasound guidance  WITH BIOPSY OF 1 OR 2 LYMPH NODE STATIONS WITH ENDOBRONCHIAL ULTRASOUND GUIDANCE;  Surgeon: Richie Montes MD;  Location: [facility] OR     CARPAL TUNNEL RELEASE RT/LT       CATARACT IOL, RT/LT Right 12/2018     CATARACT IOL, RT/LT  01/2019       Family History:    Family History   Problem Relation Age of Onset     Cancer  "Father      Macular Degeneration Brother        Social History:  Marital Status:   [5]  Social History     Tobacco Use     Smoking status: Former Smoker     Types: Cigarettes, Pipe     Smokeless tobacco: Never Used     Tobacco comment: occasionally smoker. Stopped in his 30's.   Substance Use Topics     Alcohol use: Yes     Frequency: 4 or more times a week     Comment: beer - 1 daily     Drug use: Never        Medications:    amoxicillin (AMOXIL) 875 MG tablet  fluticasone (FLONASE) 50 MCG/ACT nasal spray  HYDROCHLOROTHIAZIDE PO  TERAZOSIN HCL PO          Review of Systems  Further problem focused system review negative.    Physical Exam   BP: (!) 145/71  Pulse: 85  Temp: 99  F (37.2  C)  Resp: 20  Height: 172.7 cm (5' 8\")  Weight: 71.2 kg (157 lb)  SpO2: 95 %      Physical Exam  Nursing note and vitals were reviewed.  Constitutional: Awake and alert, adequately nourished and developed appearing 87-year-old in no apparent discomfort,  who answers questions appropriately and cooperates with examination.  HEENT: EACs are clear.  TMs are normal.  Oropharynx is unremarkable.  Voice is hyponasal.  PERRL EOMI.   Neck: Freely mobile.  Pulmonary/Chest: Breathing is unlabored.   Neurological: Alert, oriented, thought content logical, coherent   Skin: Warm, dry, no rashes.  Psychiatric: Affect broad and appropriate.      ED Course        Procedures               Critical Care time:  none               No results found for this or any previous visit (from the past 24 hour(s)).    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  I discussed with him that these are rarely bacterial infections and that antibiotics may not " "help but he feels like he cannot \"kick it\" without an antibiotic.  I have given him amoxicillin to take twice daily for 7 days.  I advised to try using fluticasone nasal spray 1 spray in each nostril once or twice daily for 7 days.  Follow-up in primary care if symptoms persist.    I have reviewed the nursing notes.    I have reviewed the findings, diagnosis, plan and need for follow up with the patient.       New Prescriptions    AMOXICILLIN (AMOXIL) 875 MG TABLET    Take 1 tablet (875 mg) by mouth 2 times daily    FLUTICASONE (FLONASE) 50 MCG/ACT NASAL SPRAY    Spray 1 spray into both nostrils daily       Final diagnoses:   Acute sinusitis, recurrence not specified, unspecified location       6/3/2021   [facility] EMERGENCY DEPT     Dustin Charles MD  06/03/21 1404    "

## 2021-06-08 ENCOUNTER — OFFICE VISIT (OUTPATIENT)
Dept: FAMILY MEDICINE | Facility: CLINIC | Age: 86
End: 2021-06-08
Payer: MEDICARE

## 2021-06-08 VITALS
RESPIRATION RATE: 18 BRPM | SYSTOLIC BLOOD PRESSURE: 110 MMHG | HEIGHT: 68 IN | OXYGEN SATURATION: 93 % | WEIGHT: 159 LBS | BODY MASS INDEX: 24.1 KG/M2 | HEART RATE: 89 BPM | DIASTOLIC BLOOD PRESSURE: 52 MMHG | TEMPERATURE: 98.1 F

## 2021-06-08 DIAGNOSIS — D03.59 MELANOMA IN SITU OF TORSO EXCLUDING BREAST (H): ICD-10-CM

## 2021-06-08 DIAGNOSIS — R82.998 BROWN-COLORED URINE: ICD-10-CM

## 2021-06-08 DIAGNOSIS — J01.90 ACUTE SINUSITIS WITH SYMPTOMS > 10 DAYS: Primary | ICD-10-CM

## 2021-06-08 LAB
ALBUMIN UR-MCNC: NEGATIVE MG/DL
APPEARANCE UR: CLEAR
BILIRUB UR QL STRIP: NEGATIVE
COLOR UR AUTO: YELLOW
GLUCOSE UR STRIP-MCNC: NEGATIVE MG/DL
HGB UR QL STRIP: NEGATIVE
KETONES UR STRIP-MCNC: NEGATIVE MG/DL
LEUKOCYTE ESTERASE UR QL STRIP: NEGATIVE
NITRATE UR QL: NEGATIVE
PH UR STRIP: 5 PH (ref 5–7)
SOURCE: NORMAL
SP GR UR STRIP: 1.02 (ref 1–1.03)
UROBILINOGEN UR STRIP-ACNC: 0.2 EU/DL (ref 0.2–1)

## 2021-06-08 PROCEDURE — 81003 URINALYSIS AUTO W/O SCOPE: CPT | Performed by: FAMILY MEDICINE

## 2021-06-08 PROCEDURE — 99203 OFFICE O/P NEW LOW 30 MIN: CPT | Performed by: FAMILY MEDICINE

## 2021-06-08 ASSESSMENT — MIFFLIN-ST. JEOR: SCORE: 1370.72

## 2021-06-08 NOTE — PATIENT INSTRUCTIONS
For your allergies, I recommend the followin. Use afrin nasal spray twice a day for up to 3 days. This helps open up the sinuses.  2. Use flonase nasal spray 1-2x a day during seasons when you are more prone to allergies.  3. Take zyrtec every day during allergy seasons. It works best when you take it regularly. You can take extra (such as 2-3 tablets per day) if needed, when your allergies are worse.  4. Consider getting a neti pot or other nasal wash system (they come in teapot styles and squeeze bottle styles). It's important to always use previously filtered or boiled water (or bottled drinking water) to avoid a very rare but dangerous type of water-borne infection. Follow the instructions that come with the product. You can do this approximately once per day, or however often you need for relief.

## 2021-06-08 NOTE — PROGRESS NOTES
Assessment & Plan     Brown urine  Normal UA - reassurance given and encouraged more hydration  - UA reflex to Microscopic and Culture    Acute sinusitis with symptoms > 10 days  Agree with ER provider and explained to family, I am not convinced that this is truly bacterial sinusitis but given sx and duration as well as failed other tx, plus currently being treated for metastatic cancer, I felt augmentin would be warranted - they were already started on amoxicillin and will discontinue that. I also reviewed aggressive allergy cares which they were only doing a little bit of here and there and citing lack of efficacy. I am not sure he is having allergies either but given exam, duration, I believe those same recs would improve symptoms significantly. I told them to call us back in 3 days if no improvements and I would send a 5 day course of prednisone 40mg - no new appt needed. They had requested this and I had advised against as an initial treatment. I explained to them benefits and risks of systemic steroid and he has tolerated before therefore they would like to try it if intranasal is not sufficient.  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)  Noting here due to effects of immunotherapy infusions that I explained I don't have expertise with the side effects but would not be surprised if it related to this prolonged illness/symptoms    Courtney Salter MD  [facility]        Nury Fritz is a 87 year old who presents for the following health issues  accompanied by his daughter:    HPI     Genitourinary - Male  Onset/Duration: 14 days ago   Description: Patient is having pain, frequency and the urine is dark and cloudy, patient is doing infusions immuno therapy for cancer   Dysuria (painful urination): YES dark color and cloudy   Hematuria (blood in urine): no  Frequency: YES  Waking at  "night to urinate: YES  Hesitancy (delay in urine): YES  Retention (unable to empty): no  Decrease in urinary flow: YES  Incontinence: no  Progression of Symptoms:  same  Accompanying Signs & Symptoms:  Fever: no  Back/Flank pain: no  Urethral discharge: no  Testicle lumps/masses/pain: no  Nausea and/or vomiting: no  Abdominal pain: no  History:   History of frequent UTI s: YES  History of kidney stones: no  History of hernias: no  Personal or Family history of Prostate problems: YES- father patient has enlarged prostate   Sexually active: no  Precipitating or alleviating factors: none   Therapies tried and outcome: increase fluid intake    Feels his head is stuffed up and ears feel plugged  Metastatic melanoma found incidentally when he had a lipoma removed  Patient has been through [employer], immunotherapy drugs - so the first time he got it, he has been having these symptoms.  His oncs suggested he needs to be evaluated (versus patient and his family thought maybe it was just a side effect)  They went to ER and they have tried everything OTC (robitussin, all the different nasal sprays afrin, flonase, mucinex) and amoxicillin prescribed and none of it has helped    His next infusion is next Tuesday  Feels like he hasn't been drinking normal amounts of water as well as already has some urinary hesitancy from known BPH      Review of Systems   As above        Objective    /52   Pulse 89   Temp 98.1  F (36.7  C) (Tympanic)   Resp 18   Ht 1.727 m (5' 8\")   Wt 72.1 kg (159 lb)   SpO2 93%   BMI 24.18 kg/m    Body mass index is 24.18 kg/m .  Physical Exam   GENERAL: healthy, alert and no distress  EYES: Eyes grossly normal to inspection, PERRL and conjunctivae and sclerae normal  HENT: both ears: clear effusion, nasal mucosa edematous, rhinorrhea clear, oropharynx clear and oral mucous membranes moist  NECK: no adenopathy, no asymmetry, masses, or scars and thyroid normal to palpation  RESP: lungs clear to " auscultation - no rales, rhonchi or wheezes  CV: regular rate and rhythm, normal S1 S2, no S3 or S4, no murmur, click or rub, no peripheral edema and peripheral pulses strong  ABDOMEN: soft, nontender, no hepatosplenomegaly, no masses and bowel sounds normal  MS: no gross musculoskeletal defects noted, no edema

## 2021-06-09 NOTE — PROGRESS NOTES
Lam Barrera is a 86 y.o. male who is being evaluated via a billable video visit.        Video visit   Lam Barrera   86 y.o. male    Date of Visit: 7/23/2020    Chief Complaint   Patient presents with     Dysuria     Fever of 101.0, burning with urination, chills since last night        Assessment and Plan   1. Fever, unspecified fever cause  He has had a fever up to 101.  He is having muscle aches and fatigue.  This could be due to COVID infection although he is not having respiratory symptoms.  He does also have some urinary symptoms which need to be evaluated.  Does not seem to be in distress or lightheaded.  He is out of town and therefore I recommend that he go to a local urgent care or emergency department.  He states that he knows of 1 that is close by and he will go there.    2. Myalgia  As above.         No follow-ups on file.     History of Present Illness   This 86 y.o. old male is evaluated with a video visit today.  He notes 2 days now of fever up to 101, myalgias and fatigue.  Also notes a little bit of burning with urination.  He does not have any kidney pain.  No nausea or vomiting and no respiratory symptoms.  He is not feeling lightheaded.  He is concerned he might have COVID infection.  Also concerned about a urinary tract infection.    Review of Systems: As above, systems otherwise reviewed and negative.     Medications, Allergies and Problem List   Patient Active Problem List   Diagnosis     Essential Hypertension     Benign Prostatic Hypertrophy     Abdominal Pain     Current Outpatient Medications   Medication Sig Dispense Refill     hydroCHLOROthiazide (HYDRODIURIL) 25 MG tablet TAKE 1 TABLET EVERY DAY 90 tablet 3     terazosin (HYTRIN) 5 MG capsule Take 5 mg by mouth at bedtime.       No current facility-administered medications for this visit.      No Known Allergies       Physical Exam     There were no vitals taken for this visit.    General: This is an alert male, sitting comfortably,  "no respiratory distress.     Additional Information   Social History     Tobacco Use     Smoking status: Former Smoker     Smokeless tobacco: Never Used   Substance Use Topics     Alcohol use: Not on file     Drug use: Not on file            Michelle Martínez MD      The patient has been notified of following:     \"This video visit will be conducted via a call between you and your physician/provider. We have found that certain health care needs can be provided without the need for an in-person physical exam.  This service lets us provide the care you need with a video conversation.  If a prescription is necessary we can send it directly to your pharmacy.  If lab work is needed we can place an order for that and you can then stop by our lab to have the test done at a later time.    Video visits are billed at different rates depending on your insurance coverage. Please reach out to your insurance provider with any questions.    If during the course of the call the physician/provider feels a video visit is not appropriate, you will not be charged for this service.\"    Patient has given verbal consent to a Video visit? Yes  How would you like to obtain your AVS? AVS Preference: [employer].  If dropped by the video visit, the video invitation should be sent to: Text to cell phone: [phone]  Will anyone else be joining your video visit? No        Video Start Time: 11:02 am        Video-Visit Details    Type of service:  Video Visit    Video End Time (time video stopped): 11:09 am  Originating Location (pt. Location): Home    Distant Location (provider location):  [facility] INTERNAL MEDICINE     Platform used for Video Visit: [employer]      Michelle Martníez MD  "

## 2021-06-09 NOTE — TELEPHONE ENCOUNTER
"Pt reports \"fever with chills.\"  Onset last evening.    \"Had chills all night -> \"couldn't sleep.\"  No OTC fever reducers tried.    Current temp 100.8 (orally).  No oral intake taken within the 30 mins prior to checking oral temp.    \"Also burning sensation with urinating.\"  \"Cloudiness of urine.\"  Suspects UTi.    Pt agrees to telephone provider visit for 2 purposes:  1) order for urinary lab specimen  2) order for PCR Covid19 nasal swab testing    Pt states \"My birthday party is coming up on July 25th and I need to know if I have covid or just a  UTI.\"    Dayna Muñoz RN  [facility] T[facility]     _______________________________    Provided precautionary measures per current protocols:  COVID 19 Nurse Triage Plan/Patient Instructions    Please be aware that novel coronavirus (COVID-19) may be circulating in the community. If you develop symptoms such as fever, cough, or SOB or if you have concerns about the presence of another infection including coronavirus (COVID-19), please contact your health care provider or visit [email or web address].     Disposition/Instructions    Additional COVID19 information to add for patients.   How can I protect others?  If you have symptoms (fever, cough, body aches or trouble breathing): Stay home and away from others (self-isolate) until:    At least 10 days have passed since your symptoms started. And     You ve had no fever--and no medicine that reduces fever--for 3 full days (72 hours). And      Your other symptoms have resolved (gotten better).     If you don t have symptoms, but a test showed that you have COVID-19 (you tested positive):    Stay home and away from others (self-isolate) until at least 10 days have passed since the date of your first positive COVID-19 test.    During this time:    Stay in your own room, even for meals. Use your own bathroom if you can.     Stay away from others in your home. No hugging, kissing or shaking hands. No visitors.    Don t go to work, " school or anywhere else.     Clean  high touch  surfaces often (doorknobs, counters, handles, etc.). Use a household cleaning spray or wipes. You ll find a full list on the EPA website:  www.epa.gov/pesticide-registration/list-n-disinfectants-use-against-sars-cov-2.    Cover your mouth and nose with a mask, tissue or washcloth to avoid spreading germs.    Wash your hands and face often. Use soap and water.    Caregivers in these groups are at risk for severe illness due to COVID-19:  o People 65 years and older  o People who live in a nursing home or long-term care facility  o People with chronic disease (lung, heart, cancer, diabetes, kidney, liver, immunologic)  o People who have a weakened immune system, including those who:  - Are in cancer treatment  - Take medicine that weakens the immune system, such as corticosteroids  - Had a bone marrow or organ transplant  - Have an immune deficiency  - Have poorly controlled HIV or AIDS  - Are obese (body mass index of 40 or higher)  - Smoke regularly    Caregivers should wear gloves while washing dishes, handling laundry and cleaning bedrooms and bathrooms.    Use caution when washing and drying laundry: Don t shake dirty laundry, and use the warmest water setting that you can.    For more tips, go to www.cdc.gov/coronavirus/2019-ncov/downloads/10Things.pdf.    How can I take care of myself?  1. Get lots of rest. Drink extra fluids (unless a doctor has told you not to).     2. Take Tylenol (acetaminophen) for fever or pain. If you have liver or kidney problems, ask your family doctor if it s okay to take Tylenol.     Adults can take either:     650 mg (two 325 mg pills) every 4 to 6 hours, or     1,000 mg (two 500 mg pills) every 8 hours as needed.     Note: Don t take more than 3,000 mg in one day.   Acetaminophen is found in many medicines (both prescribed and over-the-counter medicines). Read all labels to be sure you don t take too much.     For children, check the  Tylenol bottle for the right dose. The dose is based on the child s age or weight.    3. If you have other health problems (like cancer, heart failure, an organ transplant or severe kidney disease): Call your specialty clinic if you don t feel better in the next 2 days.    4. Know when to call 911: Emergency warning signs include:    Trouble breathing or shortness of breath    Pain or pressure in the chest that doesn t go away    Feeling confused like you haven t felt before, or not being able to wake up    Bluish-colored lips or face    What are the symptoms of COVID-19?     The most common symptoms are cough, fever and trouble breathing.     Less common symptoms include body aches, chills, diarrhea (loose, watery poops), fatigue (feeling very tired), headache, runny nose, sore throat and loss of smell.    COVID-19 can cause severe coughing (bronchitis) and lung infection (pneumonia).    How does it spread?     The virus may spread when a person coughs or sneezes into the air. The virus can travel about 6 feet this way, and it can live on surfaces.      Common  (household disinfectants) will kill the virus.    Who is at risk?  Anyone can catch COVID-19 if they re around someone who has the virus.    How can others protect themselves?     Stay away from people who have COVID-19 (or symptoms of COVID-19).    Wash hands often with soap and water. Or, use hand  with at least 60% alcohol.    Avoid touching the eyes, nose or mouth.     Wear a face mask when you go out in public, when sick or when caring for a sick person.    Where can I get more information?     [employer] [place]: About COVID-19: [email or web address]    CDC: What to Do If You re Sick: www.cdc.gov/coronavirus/2019-ncov/about/steps-when-sick.html    CDC: Ending Home Isolation: www.cdc.gov/coronavirus/2019-ncov/hcp/disposition-in-home-patients.html     CDC: Caring for Someone:  www.cdc.gov/coronavirus/2019-ncov/if-you-are-sick/care-for-someone.html    [facility]: Interim Guidance for Hospital Discharge to Home: www.[email or web address]/diseases/coronavirus/hcp/hospdischarge.pdf    [facility] clinical trials (COVID-19 research studies): clinicalaffairs.[facility]-clinical-trials     Below are the COVID-19 hotlines at the [place] [facility] ([facility]). Interpreters are available.   o For health questions: Call 263-269-1036 or 1-717.885.6540 (7 a.m. to 7 p.m.)  o For questions about schools and childcare: Call 997-255-4094 or 1-607.767.3067 (7 a.m. to 7 p.m.)            Thank you for taking steps to prevent the spread of this virus.  o Limit your contact with others.  o Wear a simple mask to cover your cough.  o Wash your hands well and often.    Resources    [facility]: About COVID-19: [email or web address]    CDC: What to Do If You're Sick: www.cdc.gov/coronavirus/2019-ncov/about/steps-when-sick.html    CDC: Ending Home Isolation: www.cdc.gov/coronavirus/2019-ncov/hcp/disposition-in-home-patients.html     CDC: Caring for Someone: www.cdc.gov/coronavirus/2019-ncov/if-you-are-sick/care-for-someone.html     [facility]: Interim Guidance for Hospital Discharge to Home: www.[email or web address]./diseases/coronavirus/hcp/hospdischarge.pdf    [facility] clinical trials (COVID-19 research studies): clinicalaffairs.[facility]-clinical-trials     Below are the COVID-19 hotlines at the [place] [facility] ([facility]). Interpreters are available.   o For health questions: Call 803-154-7980 or 1-991.436.3043 (7 a.m. to 7 p.m.)  o For questions about schools and childcare: Call 820-004-0894 or 1-276.152.8750 (7 a.m. to 7 p.m.)       Reason for Disposition    All other males with painful urination    Additional Information    Negative: Shock suspected (e.g., cold/pale/clammy skin, too weak to stand, low BP, rapid pulse)    Negative: Sounds like a life-threatening  emergency to the triager    [1] Discomfort (pain, burning or stinging) when passing urine AND [2] male    Negative: Shock suspected (e.g., cold/pale/clammy skin, too weak to stand, low BP, rapid pulse)    Negative: Sounds like a life-threatening emergency to the triager    Negative: Followed a genital injury (e.g., penis, scrotum)    Negative: Taking antibiotic for urinary tract infection (UTI)    Negative: Pain in scrotum is main symptom    Negative: [1] Unable to urinate (or only a few drops) > 4 hours AND     [2] bladder feels very full (e.g., feels blocked with strong urge to urinate; palpable bladder)    Negative: Swollen scrotum    Negative: [1] Constant pain in scrotum or testicle AND [2] present > 1 hour    Negative: Vomiting    Negative: Patient sounds very sick or weak to the triager    Negative: [1] SEVERE pain with urination  (e.g., excruciating) AND [2] not improved after 2 hours of pain medicine (e.g., acetaminophen or ibuprofen)    Negative: Fever > 100.5 F (38.1 C)    Negative: Side (flank) or lower back pain present    Negative: Diabetes mellitus or weak immune system (e.g., HIV positive, cancer chemo, splenectomy, organ transplant, chronic steroids)    Negative: Bedridden (e.g., nursing home patient, CVA, chronic illness, recovering from surgery)    Negative: Artificial heart valve or artificial joint    Negative: Pus (white, yellow) or bloody discharge from end of penis    Negative: Blood in urine (red, pink, or tea-colored)    Protocols used: URINATION PAIN - MALE-A-AH, URINARY SYMPTOMS-A-AH

## 2021-06-09 NOTE — TELEPHONE ENCOUNTER
RN cannot approve Refill Request    RN can NOT refill this medication Protocol failed and NO refill given. Last office visit: Visit date not found Last Physical: 12/14/2018 Last MTM visit: Visit date not found Last visit same specialty: Visit date not found.  Next visit within 3 mo: Visit date not found  Next physical within 3 mo: Visit date not found      Courtney Spears, [facility] Triage/Med Refill 6/18/2020    Requested Prescriptions   Pending Prescriptions Disp Refills     hydroCHLOROthiazide (HYDRODIURIL) 25 MG tablet [Pharmacy Med Name: HYDROCHLOROTHIAZIDE 25 MG Tablet] 90 tablet 3     Sig: TAKE 1 TABLET EVERY DAY       Diuretics/Combination Diuretics Refill Protocol  Failed - 6/17/2020 11:48 PM        Failed - Visit with PCP or prescribing provider visit in past 12 months     Last office visit with prescriber/PCP: Visit date not found OR same dept: Visit date not found OR same specialty: Visit date not found  Last physical: 12/14/2018 Last MTM visit: Visit date not found   Next visit within 3 mo: Visit date not found  Next physical within 3 mo: Visit date not found  Prescriber OR PCP: Josh Bahena MD  Last diagnosis associated with med order: 1. HTN (hypertension)  - hydroCHLOROthiazide (HYDRODIURIL) 25 MG tablet [Pharmacy Med Name: HYDROCHLOROTHIAZIDE 25 MG Tablet]; TAKE 1 TABLET EVERY DAY  Dispense: 90 tablet; Refill: 3    If protocol passes may refill for 12 months if within 3 months of last provider visit (or a total of 15 months).             Failed - Serum Potassium in past 12 months      No results found for: LN-POTASSIUM          Failed - Serum Sodium in past 12 months      No results found for: LN-SODIUM          Failed - Blood pressure on file in past 12 months     BP Readings from Last 1 Encounters:   12/14/18 102/62             Failed - Serum Creatinine in past 12 months      Creatinine   Date Value Ref Range Status   05/31/2011 1.5 (H) 0.6 - 1.3 mg/dL Final

## 2021-06-10 ENCOUNTER — TELEPHONE (OUTPATIENT)
Dept: FAMILY MEDICINE | Facility: CLINIC | Age: 86
End: 2021-06-10

## 2021-06-10 DIAGNOSIS — J01.90 ACUTE SINUSITIS WITH SYMPTOMS > 10 DAYS: Primary | ICD-10-CM

## 2021-06-10 RX ORDER — PREDNISONE 20 MG/1
40 TABLET ORAL DAILY
Qty: 10 TABLET | Refills: 0 | Status: SHIPPED | OUTPATIENT
Start: 2021-06-10 | End: 2021-06-15

## 2021-06-10 NOTE — TELEPHONE ENCOUNTER
Reason for call:  Patient reporting a symptom    Symptom or request: Pt's daughter calling.  Pt saw Dr. Salter 2 days ago and is not feeling any better.  Daughter wants Rx for Prednisone ASAP/TODAY as dicussed at clinic visit.  Please call patient's duaghter and advise.    [facility]     Duration (how long have symptoms been present): ongoing    Have you been treated for this before? Yes    Additional comments:     Phone Number patient can be reached at:  Other phone number:  [phone]    Best Time:  any    Can we leave a detailed message on this number:  YES    Call taken on 6/10/2021 at 2:59 PM by Jackie Young

## 2021-06-10 NOTE — TELEPHONE ENCOUNTER
Patient's daughter reports that he did start taking the Augmentin for a potential sinus infection. He is also using a jada pot, zyrtec, Flonase. His ears and nose are still plugged. Patient did talk with his care team in oncology and they said that it would be ok to try the prednisone. He does have an appointment with them on 6/15/21. Patient's daughter would like to  the prescription tonight. He is staying with her in [place] and as she is going to be bringing him back up to [facility] and he will not have access to a vehicle to the prescription tomorrow.     Per Dr. Salter's office visit notes:   I told them to call us back in 3 days if no improvements and I would send a 5 day course of prednisone 40mg - no new appt needed.    Routed to Dr. Salter and MD Schaffer.  Bridget Nguyen RN

## 2021-06-12 NOTE — PROGRESS NOTES
Lam came in today for his flu and Prevnar 13 shots today     Aleyda Casanova CMA ([facility])

## 2021-06-15 NOTE — TELEPHONE ENCOUNTER
No , he should be good . He did get the covid vaccine and if he is feeling better then we can wait .

## 2021-06-15 NOTE — TELEPHONE ENCOUNTER
Patient reports the cough is new and he has a runny nose.    Patient denies muscle pain, fatigue, chills, vomiting/diarrhea, shortness of breath, no loss of sense of taste or smell.      Patient states symptoms started 3 days ago but feeling somewhat better.    Does provider recommend he get Covid test?

## 2021-06-15 NOTE — TELEPHONE ENCOUNTER
Reason for call:  Patient reporting a symptom    Symptom or request: FORMER PATIENT OF DR FALLON, CALLING IN WITH SYMPTOMS OF:  RUNNING NOSE AND  COUGH, NO FEVER,   HE HAD BOTH OF HIS COVID VACCINE  PFIZER  1ST - 02/12/2021 AND THE 2ND - 02/25/2021  WONDERING IF HE JUST HAS A COLD OR SHOULD DO A COVID TEST  Duration (how long have symptoms been present): 3 DAYS    Have you been treated for this before? Yes    Additional comments: N/A    Phone Number patient can be reached at:  Cell number on file:    Telephone Information:   Mobile [phone]       Best Time:  ANYTIME    Can we leave a detailed message on this number: Yes    Call taken on 3/9/2021 at 9:16 AM by Lori Olivares

## 2021-06-17 NOTE — TELEPHONE ENCOUNTER
RN cannot approve Refill Request    RN can NOT refill this medication No established PCP. Last office visit: Visit date not found Last Physical: Visit date not found Last MTM visit: Visit date not found Last visit same specialty: Visit date not found.  Next visit within 3 mo: Visit date not found  Next physical within 3 mo: Visit date not found      Aracely Bryant, [facility] Triage/Med Refill 4/30/2021    Requested Prescriptions   Pending Prescriptions Disp Refills     hydroCHLOROthiazide (HYDRODIURIL) 25 MG tablet [Pharmacy Med Name: HYDROCHLOROTHIAZIDE 25 MG Tablet] 90 tablet 3     Sig: TAKE 1 TABLET EVERY DAY       Diuretics/Combination Diuretics Refill Protocol  Failed - 4/29/2021  1:09 PM        Failed - Serum Potassium in past 12 months      No results found for: LN-POTASSIUM          Failed - Serum Sodium in past 12 months      No results found for: LN-SODIUM          Failed - Blood pressure on file in past 12 months     BP Readings from Last 1 Encounters:   12/14/18 102/62             Failed - Serum Creatinine in past 12 months      Creatinine   Date Value Ref Range Status   05/31/2011 1.5 (H) 0.60 - 1.30 mg/dL Final             Passed - Visit with PCP or prescribing provider visit in past 12 months     Last office visit with prescriber/PCP: Visit date not found OR same dept: Visit date not found OR same specialty: Visit date not found  Last physical: Visit date not found Last MTM visit: Visit date not found   Next visit within 3 mo: Visit date not found  Next physical within 3 mo: Visit date not found  Prescriber OR PCP: Michelle Martínez MD  Last diagnosis associated with med order: 1. HTN (hypertension)  - hydroCHLOROthiazide (HYDRODIURIL) 25 MG tablet [Pharmacy Med Name: HYDROCHLOROTHIAZIDE 25 MG Tablet]; TAKE 1 TABLET EVERY DAY  Dispense: 90 tablet; Refill: 3    If protocol passes may refill for 12 months if within 3 months of last provider visit (or a total of 15 months).

## 2021-06-22 NOTE — PROGRESS NOTES
[facility] Follow Up Note    Lam Barrera   85 y.o. male    Date of Visit: 12/14/2018    Chief Complaint   Patient presents with     Pre-op Exam     Cataract surgery on 12/21 at [facility] by Dr Chris Arrington  This is an 85-year-old man that I have not seen for a few years.  He comes in today for preoperative evaluation prior to cataract surgery.  The left eye will be done on December 21 in the right eye on January 4.  He has had progressive deterioration of his visual acuity and is having particular issues with nighttime vision and driving.  In consultation with his ophthalmologist they have now decided to go ahead with the surgery as scheduled.  He offers no other complaints at this time and says he has been feeling good.  No recent changes in his medical history.  Medications are as listed.    Past medical history: Surgeries included carpal tunnel repair.  Medical issues have included hypertension and BPH.  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  No known drug allergies.    Social history: The patient is .  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  He is not a smoker.    Family history: Noncontributory to the current problem.    ROS A comprehensive review of systems was performed and was otherwise negative    Medications, allergies, and problem list were reviewed and updated    Exam  General Appearance:   On examination today his blood pressure is 102/62.  Weight is 161 pounds and height is 68 inches.  BMI is 24.48.    Eyes: Bilateral cataracts.    Ears nose and throat: Normal.    Neck: Supple with no masses and no neck vein distention.  No thyroid enlargement.    Lungs: Clear.    Cardiovascular: Heart is in this rhythm with a rate of 54 and no ectopy.  No gallops or murmurs.  Carotid pulses are full with no bruits.  No peripheral edema.    GI: Abdomen is soft and  nontender with no distention.  No masses or organomegaly.    Neurologic: Cranial nerves are intact.  Gait is normal.    Musculoskeletal: Head and neck are normal to inspection with good range of motion.  Good range of motion in all 4 extremities.    Psychiatric: The patient is alert and oriented x3.      Assessment/Plan  1. Preoperative examination  Potassium     I do not see any contraindication to the proposed surgical procedure.  As he is on a diuretic we will check his potassium.  I do not see an indication for any additional lab work nor do I see need for any EKG.    No history of bleeding or anesthesia issues.  No sleep apnea.    All information will be forwarded to surgery before the first procedure.      Josh Bahena MD      Current Outpatient Medications on File Prior to Visit   Medication Sig     hydroCHLOROthiazide (HYDRODIURIL) 25 MG tablet TAKE 1 TABLET EVERY DAY     terazosin (HYTRIN) 5 MG capsule Take 5 mg by mouth at bedtime.     No current facility-administered medications on file prior to visit.      No Known Allergies  Social History     Tobacco Use     Smoking status: Former Smoker     Smokeless tobacco: Never Used   Substance Use Topics     Alcohol use: Not on file     Drug use: Not on file

## 2021-06-23 ENCOUNTER — OFFICE VISIT (OUTPATIENT)
Dept: FAMILY MEDICINE | Facility: CLINIC | Age: 86
End: 2021-06-23
Payer: MEDICARE

## 2021-06-23 VITALS
BODY MASS INDEX: 25.73 KG/M2 | RESPIRATION RATE: 17 BRPM | DIASTOLIC BLOOD PRESSURE: 60 MMHG | OXYGEN SATURATION: 97 % | HEIGHT: 66 IN | HEART RATE: 76 BPM | WEIGHT: 160.1 LBS | TEMPERATURE: 98 F | SYSTOLIC BLOOD PRESSURE: 122 MMHG

## 2021-06-23 DIAGNOSIS — Z00.00 ENCOUNTER FOR MEDICARE ANNUAL WELLNESS EXAM: Primary | ICD-10-CM

## 2021-06-23 DIAGNOSIS — R09.82 POST-NASAL DRAINAGE: ICD-10-CM

## 2021-06-23 DIAGNOSIS — G47.9 SLEEP DISTURBANCE: ICD-10-CM

## 2021-06-23 DIAGNOSIS — D03.59 MELANOMA IN SITU OF TORSO EXCLUDING BREAST (H): ICD-10-CM

## 2021-06-23 PROCEDURE — G0439 PPPS, SUBSEQ VISIT: HCPCS | Performed by: INTERNAL MEDICINE

## 2021-06-23 PROCEDURE — 99213 OFFICE O/P EST LOW 20 MIN: CPT | Mod: 25 | Performed by: INTERNAL MEDICINE

## 2021-06-23 ASSESSMENT — MIFFLIN-ST. JEOR: SCORE: 1343.96

## 2021-06-23 NOTE — PROGRESS NOTES
"SUBJECTIVE:   Lam Barrera Jr. is a 87 year old male who presents for Preventive Visit.    {Split Bill scripting  The purpose of this visit is to discuss your medical history and prevent health problems before you are sick. You may be responsible for a co-pay, coinsurance, or deductible if your visit today includes services such as checking on a sore throat, having an x-ray or lab test, or treating and evaluating a new or existing condition :701258}    Patient has been advised of split billing requirements and indicates understanding: {Yes and No:732103}     Are you in the first 12 months of your Medicare coverage?  { :367865::\"No\"}    HPI  Do you feel safe in your environment? { :757983}    Have you ever done Advance Care Planning? (For example, a Health Directive, POLST, or a discussion with a medical provider or your loved ones about your wishes): { :848318}    {Hearing Test Done (Optional):632517}   Fall risk  { :024120}  {If any of the above assessments are answered yes, consider ordering appropriate referrals (Optional):544484::\"click delete button to remove this line now\"}  Cognitive Screening { :312488}    {Do you have sleep apnea, excessive snoring or daytime drowsiness? (Optional):107211}    Reviewed and updated as needed this visit by clinical staff                 Reviewed and updated as needed this visit by Provider                Social History     Tobacco Use     Smoking status: Former Smoker     Types: Cigarettes, Pipe     Smokeless tobacco: Never Used     Tobacco comment: occasionally smoker. Stopped in his 30's.   Substance Use Topics     Alcohol use: Yes     Frequency: 4 or more times a week     Comment: beer - 1 daily     {Rooming Staff- Complete this question if Prescreen response is not shown below for today's visit. If you drink alcohol do you typically have >3 drinks per day or >7 drinks per week? (Optional):877825}    No flowsheet data found.{add AUDIT responses (Optional) (A score of 7 " "for adult men is an indication of hazardous drinking; a score of 8 or more is an indication of an alcohol use disorder.  A score of 7 or more for adult women is an indication of hazardous drinking or an alchohol use disorder):078671}    {Outside tests to abstract? :135249}    {additional problems to add (Optional):263118}    Current providers sharing in care for this patient include: {Rooming staff:  Please update Care Team in Rooming Activity, refresh this note and then delete this statement}  Patient Care Team:  No Ref-Primary, Physician as PCP - Andrés Delgado (Ophthalmology)  Mirian Mcconnell MD as Resident (Ophthalmology)  Dustin Swan MD as MD (Pulmonary Disease)  Dustin Swan MD as Assigned Pulmonology Provider  Martine Honeycutt PA-C as Assigned Surgical Provider    The following health maintenance items are reviewed in Epic and correct as of today:  Health Maintenance Due   Topic Date Due     ANNUAL REVIEW OF HM ORDERS  Never done     ADVANCE CARE PLANNING  Never done     DTAP/TDAP/TD IMMUNIZATION (1 - Tdap) Never done     MEDICARE ANNUAL WELLNESS VISIT  Never done     Pneumococcal Vaccine: 65+ Years (1 of 1 - PPSV23) Never done     FALL RISK ASSESSMENT  07/23/2021     {Chronicprobdata (optional):777722}  {Decision Support (Optional):800613}    {Mammo DS 75+ :858270}  Pertinent mammograms are reviewed under the imaging tab.    Review of Systems  {ROS COMP (Optional):016458}    OBJECTIVE:   There were no vitals taken for this visit. Estimated body mass index is 24.18 kg/m  as calculated from the following:    Height as of 6/8/21: 1.727 m (5' 8\").    Weight as of 6/8/21: 72.1 kg (159 lb).  Physical Exam  {Exam (Optional) :871416}    {Diagnostic Test Results (Optional):552872::\"Diagnostic Test Results:\",\"Labs reviewed in Epic\"}    ASSESSMENT / PLAN:   {Diag Picklist:347523}    Patient has been advised of split billing requirements and indicates understanding: {YES / " "NO:689560::\"Yes\"}  COUNSELING:  {Medicare Counselin}    Estimated body mass index is 24.18 kg/m  as calculated from the following:    Height as of 21: 1.727 m (5' 8\").    Weight as of 21: 72.1 kg (159 lb).    {Weight Management Plan (ACO) Complete if BMI is abnormal-  Ages 18-64  BMI >24.9.  Age 65+ with BMI <23 or >30 (Optional):622269}    He reports that he has quit smoking. His smoking use included cigarettes and pipe. He has never used smokeless tobacco.      Appropriate preventive services were discussed with this patient, including applicable screening as appropriate for cardiovascular disease, diabetes, osteopenia/osteoporosis, and glaucoma.  As appropriate for age/gender, discussed screening for colorectal cancer, prostate cancer, breast cancer, and cervical cancer. Checklist reviewing preventive services available has been given to the patient.    Reviewed patients plan of care and provided an AVS. The {CarePlan:629470} for Lam meets the Care Plan requirement. This Care Plan has been established and reviewed with the {PATIENT, FAMILY MEMBER, CAREGIVER:976078}.    Counseling Resources:  ATP IV Guidelines  Pooled Cohorts Equation Calculator  Breast Cancer Risk Calculator  Breast Cancer: Medication to Reduce Risk  FRAX Risk Assessment  ICSI Preventive Guidelines  Dietary Guidelines for Americans,   USDA's MyPlate  ASA Prophylaxis  Lung CA Screening    Anne-Marie Wallace MD  [facility]    Identified Health Risks:  "

## 2021-06-23 NOTE — PATIENT INSTRUCTIONS
Patient Education   Personalized Prevention Plan  You are due for the preventive services outlined below.  Your care team is available to assist you in scheduling these services.  If you have already completed any of these items, please share that information with your care team to update in your medical record.  Health Maintenance Due   Topic Date Due     ANNUAL REVIEW OF HM ORDERS  Never done     Discuss Advance Care Planning  Never done     Pneumococcal Vaccine (2 of 2 - PPSV23) 08/28/2018     FALL RISK ASSESSMENT  07/23/2021             Cough-   coughing more at night.   Mucinex DM may be helpful at night;  add inhaled nasal steroid ( Flonase). Check with oncology team if ok to use inhaled nasal steroid     Sleep  Can add Melatonin 1 mg at night.

## 2021-06-23 NOTE — PROGRESS NOTES
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   Mucinex DM may be helpful at night;  add inhaled nasal steroid.   Plan: as noted      47  minutes spent on the date of the encounter doing chart review, history and exam, documentation and further activities per the note     BMI:   Estimated body mass index is 25.84 kg/m  as calculated from the following:    Height as of this encounter: 1.676 m (5' 6\").    Weight as of this encounter: 72.6 kg (160 lb 1.6 oz).       Return in about 53 weeks (around 6/29/2022) for Annual Wellness Visit.    Anne-Marie Wallace MD  Internal Medicine   [facility] ALFREDO Fritz is a 87 year old who presents for the following health issues  accompanied by his Daughter Eli :    PCP: NICHOLAS Yan (retired)  Kathleen sims   (retired)    Eye:  Dr. Dada Morrison  [facility]  Derm: Dr. Jama Contreras  [facility]    Oncology- Dr. Lion Galdamez  Palliative Care Team- [facility]    [facility]     Hypertension Follow-up      Do you check your blood pressure regularly outside of the clinic? No     Are you following a low salt diet? No    Are your blood pressures ever more than 140 on the top number (systolic) OR more   than 90 on the bottom number (diastolic), for example 140/90? Are not being checked     Sleep concerns    Cough- wondering abut better treatment for mucous and post nasal drainage.   Tried " "ANTIBIOTICS from [facility]  Steroids- oral       Annual Wellness Visit    Patient has been advised of split billing requirements and indicates understanding: Yes     Are you in the first 12 months of your Medicare Part B coverage?  No    Physical Health:    In general, how would you rate your overall physical health? poor    Outside of work, how many days during the week do you exercise?2-3 days/week    Outside of work, approximately how many minutes a day do you exercise?30-45 minutes    If you drink alcohol do you typically have >3 drinks per day or >7 drinks per week? No    Do you usually eat at least 4 servings of fruit and vegetables a day, include whole grains & fiber and avoid regularly eating high fat or \"junk\" foods? NO    Do you have any problems taking medications regularly? No    Do you have any side effects from medications? none    Needs assistance for the following daily activities: transportation    Which of the following safety concerns are present in your home?  none identified     Hearing impairment: Yes, decreased hearing in the right ear    In the past 6 months, have you been bothered by leaking of urine? yes    Mental Health:    In general, how would you rate your overall mental or emotional health? good  PHQ-2 Score:      Do you feel safe in your environment? Yes    Have you ever done Advance Care Planning? (For example, a Health Directive, POLST, or a discussion with a medical provider or your loved ones about your wishes)? Yes, patient states has an Advance Care Planning document and will bring a copy to the clinic.    Fall risk:  Fallen 2 or more times in the past year?: No  Any fall with injury in the past year?: Yes  Timed Up and Go Test (>13.5 is fall risk; contact physician) : 10    Cognitive Screenin) Repeat 3 items (Leader, Season, Table)    2) Clock draw: NORMAL  3) 3 item recall: Recalls 3 objects  Results: 3 items recalled: COGNITIVE IMPAIRMENT LESS LIKELY    Mini-CogTM Copyright S " "Renae. Licensed by the author for use in [facility]; reprinted with permission (manas@[email or web address]). All rights reserved.      Do you have sleep apnea, excessive snoring or daytime drowsiness?: no    Current providers sharing in care for this patient include:   Patient Care Team:  No Ref-Primary, Physician as PCP - Andrés Delgado (Ophthalmology)  Mirian Mcconnell MD as Resident (Ophthalmology)  Dustin Swan MD as MD (Pulmonary Disease)  Dustin Swan MD as Assigned Pulmonology Provider  Martine Honeycutt PA-C as Assigned Surgical Provider    Patient has been advised of split billing requirements and indicates understanding: Yes  New Patient/Transfer of Care    Review of Systems   CONSTITUTIONAL: NEGATIVE for fever, chills, change in weight  ENT/MOUTH: NEGATIVE for ear, mouth and throat problems  RESP: NEGATIVE for significant cough or SOB  CV: NEGATIVE for chest pain, palpitations or peripheral edema  GI: NEGATIVE for nausea, abdominal pain, heartburn, or change in bowel habits  MUSCULOSKELETAL: NEGATIVE for significant arthralgias or myalgia  NEURO: NEGATIVE for weakness, dizziness or paresthesias  PSYCHIATRIC: NEGATIVE for changes in mood or affect      Objective    /60   Pulse 76   Temp 98  F (36.7  C) (Oral)   Resp 17   Ht 1.676 m (5' 6\")   Wt 72.6 kg (160 lb 1.6 oz)   SpO2 97%   BMI 25.84 kg/m    Body mass index is 25.84 kg/m .  Physical Exam   GENERAL: healthy, alert and no distress  NECK: no adenopathy, no asymmetry, masses, or scars and thyroid normal to palpation  RESP: lungs clear to auscultation - no rales, rhonchi or wheezes  CV: regular rates and rhythm, normal S1 S2, no S3 or S4, peripheral pulses strong and no peripheral edema  ABDOMEN: soft, nontender, no hepatosplenomegaly, no masses and bowel sounds normal  MS: no gross musculoskeletal defects noted, no edema  NEURO: Normal strength and tone, mentation intact and speech normal  PSYCH: " mentation appears normal, affect normal/bright

## 2021-06-25 NOTE — TELEPHONE ENCOUNTER
"Pt's daughter Dayanara is currently getting immuno therapy for cancer and has been having \"head congestion\" sinus issues, for about a week and a half\". Per Dayanara pt has metastatic cancer. Pt was told by oncologist to \"wait another week\".    Advised Dayanara to contact oncologist tomorrow and let him know pt is not improving and request further advice.    Dayanara verbalizes understanding and agrees to plan.     Reason for Disposition    [1] Sinus congestion (pressure, fullness) AND [2] present > 10 days    Protocols used: SINUS PAIN OR CONGESTION-A-AH      "

## 2021-07-28 ENCOUNTER — TRANSFERRED RECORDS (OUTPATIENT)
Dept: HEALTH INFORMATION MANAGEMENT | Facility: CLINIC | Age: 86
End: 2021-07-28

## 2021-10-09 ENCOUNTER — HEALTH MAINTENANCE LETTER (OUTPATIENT)
Age: 86
End: 2021-10-09

## 2022-06-17 DIAGNOSIS — I10 BENIGN HYPERTENSION: ICD-10-CM

## 2022-06-17 DIAGNOSIS — N18.32 CHRONIC KIDNEY DISEASE (CKD) STAGE G3B/A1, MODERATELY DECREASED GLOMERULAR FILTRATION RATE (GFR) BETWEEN 30-44 ML/MIN/1.73 SQUARE METER AND ALBUMINURIA CREATININE RATIO LESS THAN 30 MG/G (H): Primary | ICD-10-CM

## 2022-09-11 ENCOUNTER — HEALTH MAINTENANCE LETTER (OUTPATIENT)
Age: 87
End: 2022-09-11

## 2023-08-24 ENCOUNTER — TRANSFERRED RECORDS (OUTPATIENT)
Dept: HEALTH INFORMATION MANAGEMENT | Facility: CLINIC | Age: 88
End: 2023-08-24
Payer: COMMERCIAL

## 2023-10-07 ENCOUNTER — HEALTH MAINTENANCE LETTER (OUTPATIENT)
Age: 88
End: 2023-10-07

## (undated) DEVICE — ENDO VALVE SYR NDL KIT ULTRASOUND BRONCH NA-201SX-4022-A

## (undated) DEVICE — SOL NACL 0.9% IRRIG 1000ML BOTTLE 2F7124

## (undated) DEVICE — TUBING SUCTION 10'X3/16" N510

## (undated) DEVICE — KIT ENDO FIRST STEP DISINFECTANT 200ML W/POUCH EP-4

## (undated) DEVICE — PITCHER STERILE 1000ML  SSK9004A

## (undated) DEVICE — ENDO VALVE SUCTION BRONCH EVIS MAJ-209

## (undated) DEVICE — ENDO VALVE BX EVIS MAJ-210

## (undated) DEVICE — LINEN TOWEL PACK X5 5464

## (undated) DEVICE — SYR 30ML SLIP TIP W/O NDL 302833

## (undated) DEVICE — LUBRICANT INST KIT ENDO-LUBE 220-90

## (undated) DEVICE — LABEL MEDICATION SYSTEM 3303-P

## (undated) DEVICE — ENDO ADPT BRONCH SWIVEL Y A1002

## (undated) RX ORDER — GLYCOPYRROLATE 0.2 MG/ML
INJECTION, SOLUTION INTRAMUSCULAR; INTRAVENOUS
Status: DISPENSED
Start: 2020-11-19

## (undated) RX ORDER — PROPOFOL 10 MG/ML
INJECTION, EMULSION INTRAVENOUS
Status: DISPENSED
Start: 2020-11-19

## (undated) RX ORDER — FENTANYL CITRATE 50 UG/ML
INJECTION, SOLUTION INTRAMUSCULAR; INTRAVENOUS
Status: DISPENSED
Start: 2020-11-19

## (undated) RX ORDER — LIDOCAINE HYDROCHLORIDE 20 MG/ML
INJECTION, SOLUTION EPIDURAL; INFILTRATION; INTRACAUDAL; PERINEURAL
Status: DISPENSED
Start: 2020-11-19

## (undated) RX ORDER — ONDANSETRON 2 MG/ML
INJECTION INTRAMUSCULAR; INTRAVENOUS
Status: DISPENSED
Start: 2020-11-19

## (undated) RX ORDER — EPHEDRINE SULFATE 50 MG/ML
INJECTION, SOLUTION INTRAMUSCULAR; INTRAVENOUS; SUBCUTANEOUS
Status: DISPENSED
Start: 2020-11-19

## (undated) RX ORDER — FENTANYL CITRATE-0.9 % NACL/PF 10 MCG/ML
PLASTIC BAG, INJECTION (ML) INTRAVENOUS
Status: DISPENSED
Start: 2020-11-19